# Patient Record
Sex: FEMALE
[De-identification: names, ages, dates, MRNs, and addresses within clinical notes are randomized per-mention and may not be internally consistent; named-entity substitution may affect disease eponyms.]

---

## 2024-05-14 ENCOUNTER — RX ONLY (RX ONLY)
Age: 59
End: 2024-05-14

## 2024-05-14 RX ORDER — LORAZEPAM 1 MG/1
TABLET ORAL
Qty: 2 | Refills: 0 | Status: ERX | COMMUNITY
Start: 2024-05-14

## 2024-06-13 ENCOUNTER — APPOINTMENT (OUTPATIENT)
Dept: URBAN - METROPOLITAN AREA CLINIC 250 | Age: 59
Setting detail: DERMATOLOGY
End: 2024-06-13

## 2024-06-13 PROBLEM — C44.319 BASAL CELL CARCINOMA OF SKIN OF OTHER PARTS OF FACE: Status: ACTIVE | Noted: 2024-06-13

## 2024-06-13 PROCEDURE — 77280 THER RAD SIMULAJ FIELD SMPL: CPT

## 2024-06-13 PROCEDURE — 99213 OFFICE O/P EST LOW 20 MIN: CPT | Mod: 25

## 2024-06-13 PROCEDURE — OTHER IMAGE GUIDED - SUPERFICIAL RADIOTHERAPY: SIMULATION VISIT: OTHER

## 2024-06-13 PROCEDURE — 77300 RADIATION THERAPY DOSE PLAN: CPT

## 2024-06-13 PROCEDURE — G6001 ECHO GUIDANCE RADIOTHERAPY: HCPCS | Mod: GA

## 2024-06-13 PROCEDURE — OTHER IMAGE GUIDED - SUPERFICIAL RADIOTHERAPY: OTHER

## 2024-06-13 PROCEDURE — 77332 RADIATION TREATMENT AID(S): CPT

## 2024-06-13 PROCEDURE — 77261 THER RADIOLOGY TX PLNG SMPL: CPT

## 2024-06-13 NOTE — HPI: SKIN LESION (BASAL CELL CARCINOMA)
Is This A New Presentation, Or A Follow-Up?: Skin Lesion
Additional History: Patient was referred from Dr. Escalante's office for BCC on the Right Cheek.
Location From Outside Provider (Will Override Previously Chosen Location): Right Cheek

## 2024-06-13 NOTE — PROCEDURE: IMAGE GUIDED - SUPERFICIAL RADIOTHERAPY: SIMULATION VISIT
Bill For Dosimetry/Prescription Creation (58374): Yes
Additional Comments (Add Customization Of Note Here): Treatment area is showing a well healed biopsy site.
Ultrasound Not Used Text: Ultrasound was not performed today due to
Simulation Documentation: Per the request of Dr. Huynh the patient was seen today for Superficial Radiation Therapy planning requiring initial simulation in preparation for treatment of specific diseased sites. Simulation is necessary to determine the correct patient and treatment portal positioning, to deliver safe and effective Radiotherapy. A high-frequency ultrasound image was acquired prior to treatment today for two- dimensional evaluation of tumor volume and will be repeated per fraction for response to treatment, in addition, to geometric accuracy of field placement. Physician evaluation of the ultrasound tumor depth, width, and breadth will be ongoing through the course of treatment and is deemed medically necessary ensuring efficacy of treatment and determine whether to proceed with delivery of therapeutic. Today’s image and setup were evaluated to determine the initiation of treatment. All appropriate blocking and treatment parameters were verified by the radiation therapist and provider.\\n\\nPer Dr. Huynh, continued per fraction ultrasound guidance and simulation are required for field placement, measurement of tumor depth, width and breadth, progress, and edema monitoring.\\n\\nPer the request of Dr. Huynh , continuing medical physics review as per radiotherapy standard of care post every 5th fraction for the patient, including assessment of treatment parameters,  of dose delivery, and review of patient treatment documentation in support of the provider, is ordered, ensuring efficacy and continued safe delivery of radiotherapy. Included in the physics check is a review of patient setup information, all pertinent simulation and treatment photograph(s) checks, prescription, dose calculation verification, per fraction dose charted correctly, elapsed days and treatment days correctly charted, cumulative dose correct, and review of any prescription changes. Continued medical physics review post every 5th fraction of radiotherapy is requested by the provider for appropriate radiotherapy management and is deemed medically necessary and standard of care.
Bill For Treatment Planning: Yes- (Simple Planning- 1 Site: 25317)
Patient Positioning: Supine
Bill For Simulation: Yes- (Simple- 1 Site: 15298)
Ultrasound Used Text: Ultrasound depth is 1.16 mm.
Limb Positioning Or Elevation: elevated

## 2024-06-13 NOTE — PROCEDURE: IMAGE GUIDED - SUPERFICIAL RADIOTHERAPY
Body Location Override (Optional): Right Cheek
Detail Level: Detailed
Field Number: 1
Lesion Dimensions-X Axis In Cm: 0.6
Lesion Dimensions-Y Axis In Cm: 0.7
Lesion Margin Size In Cm: 0.5
Shield Size In Cm: 1.7 x 1.7
Applicator Size In Cm: 2.0 cm
Energy (Kv): 70
Treatment Time (Min): 0.45
Time, Dose, Fractionation Factor (Tdf) For Prescription 1: 97
Daily Dose (Cgy): 274.05
Number Of Fractions For Prescription 1: 20
Treatments Per Week: 3
Total Dose For Prescription 1 (Cgy): 5433
Add A Second Prescription?: No
Additional Fraction(S) Needed:: 0
Bill For Physics Consultation: No - Render Text Only
Physics Documentation: (Physics Check Verbiage)

## 2024-07-09 ENCOUNTER — APPOINTMENT (OUTPATIENT)
Dept: URBAN - METROPOLITAN AREA CLINIC 250 | Age: 59
Setting detail: DERMATOLOGY
End: 2024-07-10

## 2024-07-09 PROBLEM — C44.319 BASAL CELL CARCINOMA OF SKIN OF OTHER PARTS OF FACE: Status: ACTIVE | Noted: 2024-07-09

## 2024-07-09 PROCEDURE — 77401 RADIATION TX DELIVERY SUPFC: CPT

## 2024-07-09 PROCEDURE — OTHER IMAGE GUIDED - SUPERFICIAL RADIOTHERAPY: TREATMENT VISIT: OTHER

## 2024-07-09 PROCEDURE — OTHER IMAGE GUIDED - SUPERFICIAL RADIOTHERAPY: OTHER

## 2024-07-09 PROCEDURE — G6001 ECHO GUIDANCE RADIOTHERAPY: HCPCS | Mod: XU

## 2024-07-09 PROCEDURE — 77280 THER RAD SIMULAJ FIELD SMPL: CPT

## 2024-07-09 NOTE — PROCEDURE: IMAGE GUIDED - SUPERFICIAL RADIOTHERAPY
Body Location Override (Optional): Right Cheek
Detail Level: Detailed
Field Number: 1
Lesion Dimensions-X Axis In Cm: 0.6
Lesion Dimensions-Y Axis In Cm: 0.7
Lesion Margin Size In Cm: 0.5
Shield Size In Cm: 1.7 x 1.7
Applicator Size In Cm: 2.0 cm
Energy (Kv): 70
Treatment Time (Min): 0.45
Time, Dose, Fractionation Factor (Tdf) For Prescription 1: 97
Daily Dose (Cgy): 274.05
Number Of Fractions For Prescription 1: 20
Treatments Per Week: 3
Total Dose For Prescription 1 (Cgy): 5402
Add A Second Prescription?: No
Additional Fraction(S) Needed:: 0
Bill For Physics Consultation: No - Render Text Only
Physics Documentation: (Physics Check Verbiage)

## 2024-07-09 NOTE — PROCEDURE: IMAGE GUIDED - SUPERFICIAL RADIOTHERAPY: TREATMENT VISIT
Ultrasound Used Text: High frequency ultrasound depth is 1.08 mm, which is 0.08 mm in difference from previous imaging.
Fraction Number: 1
Energy (Kv): 70
Bill For Simulation (Per Medicare, Typical Course Of Radiation Therapy Will Require Between One To Three Simulations): Yes- (Simple- 1 Site: 61814)
Ultrasound Not Used Text: Ultrasound was not performed today due to
Additional Change To Daily Dosage Administered Mid Treatment?: No
Daily Dosage (Cgy): 274.05
Bill For Treatment (51854)?: Yes
Calculate Total Cumulative Dose Automatically Or Manually: Manually
Treatment Documentation: This patient has been treated today with image-guided superficial radiation therapy for non-melanoma skin cancer. Written informed consent has been previously obtained from this patient for this treatment. This consent is documented in the patient's chart. The patient gave verbal consent to continue treatment today. The patient was treated with a specific radiation dose and setup as prescribed by the provider listed on this visit note. A Radiation Therapist performed administration of radiation under the supervision of a provider. The treatment parameters and cumulative dose are indicated above. Prior to administering the radiation, the patient underwent a verification therapeutic radiology simulation-aided field setting defining relevant normal and abnormal target anatomy and acquiring images with separate and distinct diagnostic high-frequency ultrasound to delineate tissues and determine whether to proceed with delivery of therapeutic, in addition to retrieve data necessary to develop an optimal radiation treatment process for the patient. The field placement simulation documents any change seen in overall tumor volume documented in the patient’s record, allows the clinician to indicate any needed changes in the treatment plan and/or prescription, provides diagnostic evaluation as the basis for performing the therapeutic procedure, and clearly identifies the information needed to decide to proceed with the therapeutic procedure. This process includes verification of the treatment port(s) and proper treatment positioning. All treatment ports were photographed within electronic medical records. The patient's lead blocking along with gross tumor volume and margin was confirmed. Considering superficial radiotherapy is clinical in setup, this requires the physician and radiation therapist to clarify the location interest being treated against initial images, ultrasound, pathology, and patient anatomy. Care was taken to ensure de la cruz treated were geometrically accurate and properly positioned using therapeutic radiology simulation-aided field setting verification per fraction. This process is also utilized to determine if any prescription or setup changes are necessary. These steps are therefore medically necessary to ensure safe and effective administration of radiation. Ongoing therapeutic radiology simulation-aided field setting verification is ordered throughout the course of therapy.\\n\\nA high-frequency ultrasound image was acquired today for a two-dimensional evaluation of the tumor volume, depth, width, breadth, review of prior response to treatment, provide geometric accuracy of field placement, and determine whether to proceed with therapeutic delivery. \\n\\nThe field placement and ultrasound imaging, per fraction, is separate and distinct from the initial simulation and is an important task in providing safe administration of superficial radiation therapy. Physician evaluation of the ultrasound information will be ongoing throughout the course of treatment and is deemed medically necessary to ensure the efficacy of treatment, whether to proceed with therapeutic delivery, and determine any necessary changes. Today's images were evaluated for determination of continuation of treatment with the current plan or with necessary changes as appropriate. Additionally, the use of ultrasound visualization and targeted assessment allows the patient to be able to see their cancer(s) progress, encouraging the patient to complete and maintain compliance through the full course of prescribed radiotherapy. Per Dr. Huynh, continued ultrasound guidance and therapeutic radiology simulation-aided field setting verification per fraction is required for field placement, measurement of tumor depth, tissue evaluation, progress, acute effect monitoring, and determination for therapeutic treatment delivery is appropriate.
Add X Modifier?: CHEN - Unusual Non-Overlapping Service
Additional Comments (Add Customization Of Note Here): Patient did well today for her first treatment visit.

## 2024-07-10 ENCOUNTER — APPOINTMENT (OUTPATIENT)
Dept: URBAN - METROPOLITAN AREA CLINIC 250 | Age: 59
Setting detail: DERMATOLOGY
End: 2024-07-14

## 2024-07-10 PROBLEM — C44.319 BASAL CELL CARCINOMA OF SKIN OF OTHER PARTS OF FACE: Status: ACTIVE | Noted: 2024-07-10

## 2024-07-10 PROCEDURE — G6001 ECHO GUIDANCE RADIOTHERAPY: HCPCS | Mod: XU

## 2024-07-10 PROCEDURE — OTHER IMAGE GUIDED - SUPERFICIAL RADIOTHERAPY: OTHER

## 2024-07-10 PROCEDURE — 77280 THER RAD SIMULAJ FIELD SMPL: CPT

## 2024-07-10 PROCEDURE — OTHER IMAGE GUIDED - SUPERFICIAL RADIOTHERAPY: TREATMENT VISIT: OTHER

## 2024-07-10 PROCEDURE — 77401 RADIATION TX DELIVERY SUPFC: CPT

## 2024-07-10 NOTE — PROCEDURE: IMAGE GUIDED - SUPERFICIAL RADIOTHERAPY
Body Location Override (Optional): Right Cheek
Detail Level: Detailed
Field Number: 1
Lesion Dimensions-X Axis In Cm: 0.6
Lesion Dimensions-Y Axis In Cm: 0.7
Lesion Margin Size In Cm: 0.5
Shield Size In Cm: 1.7 x 1.7
Applicator Size In Cm: 2.0 cm
Energy (Kv): 70
Treatment Time (Min): 0.45
Time, Dose, Fractionation Factor (Tdf) For Prescription 1: 97
Daily Dose (Cgy): 274.05
Number Of Fractions For Prescription 1: 20
Treatments Per Week: 3
Total Dose For Prescription 1 (Cgy): 5404
Add A Second Prescription?: No
Additional Fraction(S) Needed:: 0
Bill For Physics Consultation: No - Render Text Only
Physics Documentation: (Physics Check Verbiage)

## 2024-07-16 ENCOUNTER — APPOINTMENT (OUTPATIENT)
Dept: URBAN - METROPOLITAN AREA CLINIC 250 | Age: 59
Setting detail: DERMATOLOGY
End: 2024-07-17

## 2024-07-16 PROBLEM — C44.319 BASAL CELL CARCINOMA OF SKIN OF OTHER PARTS OF FACE: Status: ACTIVE | Noted: 2024-07-16

## 2024-07-16 PROCEDURE — OTHER IMAGE GUIDED - SUPERFICIAL RADIOTHERAPY: OTHER

## 2024-07-16 PROCEDURE — 77280 THER RAD SIMULAJ FIELD SMPL: CPT

## 2024-07-16 PROCEDURE — G6001 ECHO GUIDANCE RADIOTHERAPY: HCPCS | Mod: XU

## 2024-07-16 PROCEDURE — 77401 RADIATION TX DELIVERY SUPFC: CPT

## 2024-07-16 PROCEDURE — OTHER IMAGE GUIDED - SUPERFICIAL RADIOTHERAPY: TREATMENT VISIT: OTHER

## 2024-07-16 NOTE — PROCEDURE: IMAGE GUIDED - SUPERFICIAL RADIOTHERAPY: TREATMENT VISIT
Ultrasound Used Text: High frequency ultrasound depth is 1.10 mm, which is 0.12 mm in difference from previous imaging.
Fraction Number: 3
Energy (Kv): 70
Bill For Simulation (Per Medicare, Typical Course Of Radiation Therapy Will Require Between One To Three Simulations): Yes- (Simple- 1 Site: 52977)
Ultrasound Not Used Text: Ultrasound was not performed today due to
Additional Change To Daily Dosage Administered Mid Treatment?: No
Daily Dosage (Cgy): 274.05
Bill For Treatment (83040)?: Yes
Calculate Total Cumulative Dose Automatically Or Manually: Manually
Total Cumulative Dose (Cgy): 822.15
Treatment Documentation: This patient has been treated today with image-guided superficial radiation therapy for non-melanoma skin cancer. Written informed consent has been previously obtained from this patient for this treatment. This consent is documented in the patient's chart. The patient gave verbal consent to continue treatment today. The patient was treated with a specific radiation dose and setup as prescribed by the provider listed on this visit note. A Radiation Therapist performed administration of radiation under the supervision of a provider. The treatment parameters and cumulative dose are indicated above. Prior to administering the radiation, the patient underwent a verification therapeutic radiology simulation-aided field setting defining relevant normal and abnormal target anatomy and acquiring images with separate and distinct diagnostic high-frequency ultrasound to delineate tissues and determine whether to proceed with delivery of therapeutic, in addition to retrieve data necessary to develop an optimal radiation treatment process for the patient. The field placement simulation documents any change seen in overall tumor volume documented in the patient’s record, allows the clinician to indicate any needed changes in the treatment plan and/or prescription, provides diagnostic evaluation as the basis for performing the therapeutic procedure, and clearly identifies the information needed to decide to proceed with the therapeutic procedure. This process includes verification of the treatment port(s) and proper treatment positioning. All treatment ports were photographed within electronic medical records. The patient's lead blocking along with gross tumor volume and margin was confirmed. Considering superficial radiotherapy is clinical in setup, this requires the physician and radiation therapist to clarify the location interest being treated against initial images, ultrasound, pathology, and patient anatomy. Care was taken to ensure de la cruz treated were geometrically accurate and properly positioned using therapeutic radiology simulation-aided field setting verification per fraction. This process is also utilized to determine if any prescription or setup changes are necessary. These steps are therefore medically necessary to ensure safe and effective administration of radiation. Ongoing therapeutic radiology simulation-aided field setting verification is ordered throughout the course of therapy.\\n\\nA high-frequency ultrasound image was acquired today for a two-dimensional evaluation of the tumor volume, depth, width, breadth, review of prior response to treatment, provide geometric accuracy of field placement, and determine whether to proceed with therapeutic delivery. \\n\\nThe field placement and ultrasound imaging, per fraction, is separate and distinct from the initial simulation and is an important task in providing safe administration of superficial radiation therapy. Physician evaluation of the ultrasound information will be ongoing throughout the course of treatment and is deemed medically necessary to ensure the efficacy of treatment, whether to proceed with therapeutic delivery, and determine any necessary changes. Today's images were evaluated for determination of continuation of treatment with the current plan or with necessary changes as appropriate. Additionally, the use of ultrasound visualization and targeted assessment allows the patient to be able to see their cancer(s) progress, encouraging the patient to complete and maintain compliance through the full course of prescribed radiotherapy. Per Dr. Huynh, continued ultrasound guidance and therapeutic radiology simulation-aided field setting verification per fraction is required for field placement, measurement of tumor depth, tissue evaluation, progress, acute effect monitoring, and determination for therapeutic treatment delivery is appropriate.
Add X Modifier?: CHEN - Unusual Non-Overlapping Service
Additional Comments (Add Customization Of Note Here): Treatment area is showing a well healed biopsy site. She had no issues to report.
Prescription Used: 1

## 2024-07-16 NOTE — PROCEDURE: IMAGE GUIDED - SUPERFICIAL RADIOTHERAPY
Body Location Override (Optional): Right Cheek
Detail Level: Detailed
Field Number: 1
Lesion Dimensions-X Axis In Cm: 0.6
Lesion Dimensions-Y Axis In Cm: 0.7
Lesion Margin Size In Cm: 0.5
Shield Size In Cm: 1.7 x 1.7
Applicator Size In Cm: 2.0 cm
Energy (Kv): 70
Treatment Time (Min): 0.45
Time, Dose, Fractionation Factor (Tdf) For Prescription 1: 97
Daily Dose (Cgy): 274.05
Number Of Fractions For Prescription 1: 20
Treatments Per Week: 3
Total Dose For Prescription 1 (Cgy): 5443
Add A Second Prescription?: No
Additional Fraction(S) Needed:: 0
Bill For Physics Consultation: No - Render Text Only
Physics Documentation: (Physics Check Verbiage)

## 2024-07-17 ENCOUNTER — APPOINTMENT (OUTPATIENT)
Dept: URBAN - METROPOLITAN AREA CLINIC 250 | Age: 59
Setting detail: DERMATOLOGY
End: 2024-07-20

## 2024-07-17 PROBLEM — C44.319 BASAL CELL CARCINOMA OF SKIN OF OTHER PARTS OF FACE: Status: ACTIVE | Noted: 2024-07-17

## 2024-07-17 PROCEDURE — OTHER IMAGE GUIDED - SUPERFICIAL RADIOTHERAPY: OTHER

## 2024-07-17 PROCEDURE — OTHER IMAGE GUIDED - SUPERFICIAL RADIOTHERAPY: TREATMENT VISIT: OTHER

## 2024-07-17 PROCEDURE — 77280 THER RAD SIMULAJ FIELD SMPL: CPT

## 2024-07-17 PROCEDURE — 77401 RADIATION TX DELIVERY SUPFC: CPT

## 2024-07-17 PROCEDURE — G6001 ECHO GUIDANCE RADIOTHERAPY: HCPCS | Mod: XU

## 2024-07-17 NOTE — PROCEDURE: IMAGE GUIDED - SUPERFICIAL RADIOTHERAPY: TREATMENT VISIT
Ultrasound Used Text: High frequency ultrasound depth is 1.02 mm, which is 0.08 mm in difference from previous imaging.
Fraction Number: 4
Energy (Kv): 70
Bill For Simulation (Per Medicare, Typical Course Of Radiation Therapy Will Require Between One To Three Simulations): Yes- (Simple- 1 Site: 33545)
Ultrasound Not Used Text: Ultrasound was not performed today due to
Additional Change To Daily Dosage Administered Mid Treatment?: No
Daily Dosage (Cgy): 274.05
Bill For Treatment (93057)?: Yes
Calculate Total Cumulative Dose Automatically Or Manually: Manually
Total Cumulative Dose (Cgy): 1096.2
Treatment Documentation: This patient has been treated today with image-guided superficial radiation therapy for non-melanoma skin cancer. Written informed consent has been previously obtained from this patient for this treatment. This consent is documented in the patient's chart. The patient gave verbal consent to continue treatment today. The patient was treated with a specific radiation dose and setup as prescribed by the provider listed on this visit note. A Radiation Therapist performed administration of radiation under the supervision of a provider. The treatment parameters and cumulative dose are indicated above. Prior to administering the radiation, the patient underwent a verification therapeutic radiology simulation-aided field setting defining relevant normal and abnormal target anatomy and acquiring images with separate and distinct diagnostic high-frequency ultrasound to delineate tissues and determine whether to proceed with delivery of therapeutic, in addition to retrieve data necessary to develop an optimal radiation treatment process for the patient. The field placement simulation documents any change seen in overall tumor volume documented in the patient’s record, allows the clinician to indicate any needed changes in the treatment plan and/or prescription, provides diagnostic evaluation as the basis for performing the therapeutic procedure, and clearly identifies the information needed to decide to proceed with the therapeutic procedure. This process includes verification of the treatment port(s) and proper treatment positioning. All treatment ports were photographed within electronic medical records. The patient's lead blocking along with gross tumor volume and margin was confirmed. Considering superficial radiotherapy is clinical in setup, this requires the physician and radiation therapist to clarify the location interest being treated against initial images, ultrasound, pathology, and patient anatomy. Care was taken to ensure de la cruz treated were geometrically accurate and properly positioned using therapeutic radiology simulation-aided field setting verification per fraction. This process is also utilized to determine if any prescription or setup changes are necessary. These steps are therefore medically necessary to ensure safe and effective administration of radiation. Ongoing therapeutic radiology simulation-aided field setting verification is ordered throughout the course of therapy.\\n\\nA high-frequency ultrasound image was acquired today for a two-dimensional evaluation of the tumor volume, depth, width, breadth, review of prior response to treatment, provide geometric accuracy of field placement, and determine whether to proceed with therapeutic delivery. \\n\\nThe field placement and ultrasound imaging, per fraction, is separate and distinct from the initial simulation and is an important task in providing safe administration of superficial radiation therapy. Physician evaluation of the ultrasound information will be ongoing throughout the course of treatment and is deemed medically necessary to ensure the efficacy of treatment, whether to proceed with therapeutic delivery, and determine any necessary changes. Today's images were evaluated for determination of continuation of treatment with the current plan or with necessary changes as appropriate. Additionally, the use of ultrasound visualization and targeted assessment allows the patient to be able to see their cancer(s) progress, encouraging the patient to complete and maintain compliance through the full course of prescribed radiotherapy. Per Dr. Huynh, continued ultrasound guidance and therapeutic radiology simulation-aided field setting verification per fraction is required for field placement, measurement of tumor depth, tissue evaluation, progress, acute effect monitoring, and determination for therapeutic treatment delivery is appropriate.
Add X Modifier?: CHEN - Unusual Non-Overlapping Service
Additional Comments (Add Customization Of Note Here): Treatment area is showing a well healed biopsy site. She had no issues to report and she is using cream daily.
Prescription Used: 1

## 2024-07-17 NOTE — PROCEDURE: IMAGE GUIDED - SUPERFICIAL RADIOTHERAPY
Body Location Override (Optional): Right Cheek
Detail Level: Detailed
Field Number: 1
Lesion Dimensions-X Axis In Cm: 0.6
Lesion Dimensions-Y Axis In Cm: 0.7
Lesion Margin Size In Cm: 0.5
Shield Size In Cm: 1.7 x 1.7
Applicator Size In Cm: 2.0 cm
Energy (Kv): 70
Treatment Time (Min): 0.45
Time, Dose, Fractionation Factor (Tdf) For Prescription 1: 97
Daily Dose (Cgy): 274.05
Number Of Fractions For Prescription 1: 20
Treatments Per Week: 3
Total Dose For Prescription 1 (Cgy): 5484
Add A Second Prescription?: No
Additional Fraction(S) Needed:: 0
Bill For Physics Consultation: No - Render Text Only
Physics Documentation: (Physics Check Verbiage)

## 2024-07-18 ENCOUNTER — APPOINTMENT (OUTPATIENT)
Dept: URBAN - METROPOLITAN AREA CLINIC 250 | Age: 59
Setting detail: DERMATOLOGY
End: 2024-07-19

## 2024-07-18 PROBLEM — C44.319 BASAL CELL CARCINOMA OF SKIN OF OTHER PARTS OF FACE: Status: ACTIVE | Noted: 2024-07-18

## 2024-07-18 PROCEDURE — 77401 RADIATION TX DELIVERY SUPFC: CPT

## 2024-07-18 PROCEDURE — OTHER IMAGE GUIDED - SUPERFICIAL RADIOTHERAPY: OTHER

## 2024-07-18 PROCEDURE — 99213 OFFICE O/P EST LOW 20 MIN: CPT | Mod: 25

## 2024-07-18 PROCEDURE — G6001 ECHO GUIDANCE RADIOTHERAPY: HCPCS | Mod: XU

## 2024-07-18 PROCEDURE — 77280 THER RAD SIMULAJ FIELD SMPL: CPT

## 2024-07-18 PROCEDURE — OTHER IMAGE GUIDED - SUPERFICIAL RADIOTHERAPY: TREATMENT VISIT: OTHER

## 2024-07-18 PROCEDURE — OTHER IMAGE GUIDED - SUPERFICIAL RADIOTHERAPY: EVALUATION VISIT: OTHER

## 2024-07-18 NOTE — PROCEDURE: IMAGE GUIDED - SUPERFICIAL RADIOTHERAPY
Body Location Override (Optional): Right Cheek
Detail Level: Detailed
Field Number: 1
Lesion Dimensions-X Axis In Cm: 0.6
Lesion Dimensions-Y Axis In Cm: 0.7
Lesion Margin Size In Cm: 0.5
Shield Size In Cm: 1.7 x 1.7
Applicator Size In Cm: 2.0 cm
Energy (Kv): 70
Treatment Time (Min): 0.45
Time, Dose, Fractionation Factor (Tdf) For Prescription 1: 97
Daily Dose (Cgy): 274.05
Number Of Fractions For Prescription 1: 20
Treatments Per Week: 3
Total Dose For Prescription 1 (Cgy): 5486
Add A Second Prescription?: No
Additional Fraction(S) Needed:: 0
Bill For Physics Consultation: No - Render Text Only
Physics Documentation: (Physics Check Verbiage)

## 2024-07-18 NOTE — PROCEDURE: IMAGE GUIDED - SUPERFICIAL RADIOTHERAPY: EVALUATION VISIT
Is This Visit For Evaluation During Treatment Or Follow Up Post Treatment?: evaluation
Show Ultrasound In Note?: No
Evaluation Plan: The patient is undergoing superficial radiation therapy for skin cancer and presents for weekly evaluation and management. Per protocol and as documented on the flow sheet, the patient was questioned as to subjective redness, pruritus, pain, drainage, fatigue, or any other symptoms. Objectively, the radiation area was evaluated with regards to erythema, atrophy, scale, crusting, erosion, ulceration, edema, purpura, tenderness, warmth, drainage, and any other findings. The plan was extensively reviewed including dose and dosing schedule. The simulation and clinical setup were also reviewed as were external and any internal shields and based on this review the appropriateness and sufficiency of treatment was determined.\\n\\nA high-frequency ultrasound image was acquired today for a two-dimensional evaluation of the tumor volume, depth, width, breadth, review of prior response to treatment, provide geometric accuracy of field placement, and determine whether to proceed with therapeutic delivery.\\n\\nPer Dr. Huynh, continued ultrasound guidance and therapeutic radiology simulation-aided field setting verification per fraction is required for field placement, measurement of tumor depth, tissues evaluation, progress, acute effect monitoring, and determination for therapeutic treatment delivery is appropriate.
Assessment: Appropriate reaction
Radiation Therapy Oncology [facility] (Rtog) Score: 1
Ultrasound Used Text: Today's ultrasound showed no evidence of disease.
Ultrasound Not Used Text: Ultrasound was not performed today due to
Follow Up Plan: Per the request of Dr. Huynh, patient was seen today for a 12 week follow up for Superficial Radiation Therapy. Physician evaluation of the ultrasound tumor depth, width, and breadth was ongoing through course of treatment and is deemed medically necessary ensuring efficacy of treatment. All appropriate blocking and treatment parameters verified by radiation therapist according to initial simulation. A high frequency ultrasound image was acquired today for two-dimensional evaluation of treatment volume and response to treatment, in addition, geometric accuracy of field placement. Today’s image was evaluated, lesion not detected on US.\\n\\nObjectively, the treated radiation area was evaluated with regards to erythema, atrophy, scale, crusting, erosion, ulceration, edema, purpura, tenderness, warmth, drainage, and any other finding. Patient to follow up for routine visits.
Additional Comments (Add Customization Of Note Here): Patient was doing well today.

## 2024-07-18 NOTE — PROCEDURE: IMAGE GUIDED - SUPERFICIAL RADIOTHERAPY: TREATMENT VISIT
Ultrasound Used Text: High frequency ultrasound depth is 0.96 mm, which is 0.06 mm in difference from previous imaging.
Fraction Number: 5
Energy (Kv): 70
Bill For Simulation (Per Medicare, Typical Course Of Radiation Therapy Will Require Between One To Three Simulations): Yes- (Simple- 1 Site: 73782)
Ultrasound Not Used Text: Ultrasound was not performed today due to
Additional Change To Daily Dosage Administered Mid Treatment?: No
Daily Dosage (Cgy): 274.05
Bill For Treatment (52950)?: Yes
Calculate Total Cumulative Dose Automatically Or Manually: Manually
Total Cumulative Dose (Cgy): 1370.25
Treatment Documentation: This patient has been treated today with image-guided superficial radiation therapy for non-melanoma skin cancer. Written informed consent has been previously obtained from this patient for this treatment. This consent is documented in the patient's chart. The patient gave verbal consent to continue treatment today. The patient was treated with a specific radiation dose and setup as prescribed by the provider listed on this visit note. A Radiation Therapist performed administration of radiation under the supervision of a provider. The treatment parameters and cumulative dose are indicated above. Prior to administering the radiation, the patient underwent a verification therapeutic radiology simulation-aided field setting defining relevant normal and abnormal target anatomy and acquiring images with separate and distinct diagnostic high-frequency ultrasound to delineate tissues and determine whether to proceed with delivery of therapeutic, in addition to retrieve data necessary to develop an optimal radiation treatment process for the patient. The field placement simulation documents any change seen in overall tumor volume documented in the patient’s record, allows the clinician to indicate any needed changes in the treatment plan and/or prescription, provides diagnostic evaluation as the basis for performing the therapeutic procedure, and clearly identifies the information needed to decide to proceed with the therapeutic procedure. This process includes verification of the treatment port(s) and proper treatment positioning. All treatment ports were photographed within electronic medical records. The patient's lead blocking along with gross tumor volume and margin was confirmed. Considering superficial radiotherapy is clinical in setup, this requires the physician and radiation therapist to clarify the location interest being treated against initial images, ultrasound, pathology, and patient anatomy. Care was taken to ensure de la cruz treated were geometrically accurate and properly positioned using therapeutic radiology simulation-aided field setting verification per fraction. This process is also utilized to determine if any prescription or setup changes are necessary. These steps are therefore medically necessary to ensure safe and effective administration of radiation. Ongoing therapeutic radiology simulation-aided field setting verification is ordered throughout the course of therapy.\\n\\nA high-frequency ultrasound image was acquired today for a two-dimensional evaluation of the tumor volume, depth, width, breadth, review of prior response to treatment, provide geometric accuracy of field placement, and determine whether to proceed with therapeutic delivery. \\n\\nThe field placement and ultrasound imaging, per fraction, is separate and distinct from the initial simulation and is an important task in providing safe administration of superficial radiation therapy. Physician evaluation of the ultrasound information will be ongoing throughout the course of treatment and is deemed medically necessary to ensure the efficacy of treatment, whether to proceed with therapeutic delivery, and determine any necessary changes. Today's images were evaluated for determination of continuation of treatment with the current plan or with necessary changes as appropriate. Additionally, the use of ultrasound visualization and targeted assessment allows the patient to be able to see their cancer(s) progress, encouraging the patient to complete and maintain compliance through the full course of prescribed radiotherapy. Per Dr. Huynh, continued ultrasound guidance and therapeutic radiology simulation-aided field setting verification per fraction is required for field placement, measurement of tumor depth, tissue evaluation, progress, acute effect monitoring, and determination for therapeutic treatment delivery is appropriate.
Add X Modifier?: CHEN - Unusual Non-Overlapping Service
Additional Comments (Add Customization Of Note Here): Treatment area is showing a well healed biopsy site. 
Prescription Used: 1

## 2024-07-20 ENCOUNTER — APPOINTMENT (OUTPATIENT)
Dept: URBAN - METROPOLITAN AREA CLINIC 250 | Age: 59
Setting detail: DERMATOLOGY
End: 2024-10-15

## 2024-07-20 PROBLEM — C44.319 BASAL CELL CARCINOMA OF SKIN OF OTHER PARTS OF FACE: Status: ACTIVE | Noted: 2024-07-20

## 2024-07-20 PROCEDURE — OTHER IMAGE GUIDED - SUPERFICIAL RADIOTHERAPY: OTHER

## 2024-07-20 PROCEDURE — 77336 RADIATION PHYSICS CONSULT: CPT

## 2024-07-20 NOTE — PROCEDURE: IMAGE GUIDED - SUPERFICIAL RADIOTHERAPY
Body Location Override (Optional): Right Cheek
Detail Level: Detailed
Field Number: 1
Lesion Dimensions-X Axis In Cm: 0.6
Lesion Dimensions-Y Axis In Cm: 0.7
Lesion Margin Size In Cm: 0.5
Shield Size In Cm: 1.7 x 1.7
Applicator Size In Cm: 2.0 cm
Energy (Kv): 70
Treatment Time (Min): 0.45
Time, Dose, Fractionation Factor (Tdf) For Prescription 1: 97
Daily Dose (Cgy): 274.05
Number Of Fractions For Prescription 1: 20
Treatments Per Week: 3
Total Dose For Prescription 1 (Cgy): 5483
Add A Second Prescription?: No
Add Decay And Dose Adjustment Calculation?: Yes
Treatment Date (Start): 07/09/2024
Treatment Date (Stop): 08/16/2024
Treatment Date (Resume): 08/27/2024
Tdf Of Fractions Delivered: 69.5
Decay Factor: 0.98
Adjusted Tdf: 68.11
Remaining Tdf: 30
New Total Tdf (Remaining Tdf + Adjusted Tdf): 98.11
Additional Fraction(S) Needed:: 0
Dose Decay Verbiage For Visit Day Calculation: Per the request of Dr. Huynh dose decay calculation for this patient was performed due to break in treatment. Dose decay calculation is clinically necessary to determine appropriate changes to prescription, if necessary, for efficacy of treatment.\\n\\nPer Dr. Huynh, no prescription change was made today.
Physics Consultation Performed For Fractions:: 1-5
Physics Documentation: Per the request of Dr. Huynh, continuing medical physics review as per radiotherapy standard of care post every 5th fraction for patient, including assessment of treatment parameters,  of dose delivery, and review of patient treatment documentation in support of the provider, to ensure efficacy and continued safe delivery of radiotherapy. Included in physics check is review of patient setup information, all pertinent simulation and treatment photographs checks, prescription, dose calculation verification, per fraction dose charted correctly, elapsed days and treatment days correctly charted, cumulative dose correct, and review of any prescription changes. Patient was not present, nor was it necessary for the patient to be present for weekly physics review and no other superficial radiotherapy services were rendered on this day. Continued medical physics review post every 5th fraction of therapy is requested by provider for appropriate radiotherapy management and is deemed medically necessary and standard of care.

## 2024-07-22 ENCOUNTER — APPOINTMENT (OUTPATIENT)
Dept: URBAN - METROPOLITAN AREA CLINIC 250 | Age: 59
Setting detail: DERMATOLOGY
End: 2024-07-23

## 2024-07-22 PROBLEM — C44.319 BASAL CELL CARCINOMA OF SKIN OF OTHER PARTS OF FACE: Status: ACTIVE | Noted: 2024-07-22

## 2024-07-22 PROCEDURE — 77280 THER RAD SIMULAJ FIELD SMPL: CPT

## 2024-07-22 PROCEDURE — OTHER IMAGE GUIDED - SUPERFICIAL RADIOTHERAPY: OTHER

## 2024-07-22 PROCEDURE — G6001 ECHO GUIDANCE RADIOTHERAPY: HCPCS | Mod: XU

## 2024-07-22 PROCEDURE — OTHER IMAGE GUIDED - SUPERFICIAL RADIOTHERAPY: TREATMENT VISIT: OTHER

## 2024-07-22 PROCEDURE — 77401 RADIATION TX DELIVERY SUPFC: CPT

## 2024-07-22 NOTE — PROCEDURE: IMAGE GUIDED - SUPERFICIAL RADIOTHERAPY: TREATMENT VISIT
Ultrasound Used Text: High frequency ultrasound depth is 0.92 mm, which is 0.04 mm in difference from previous imaging.
Fraction Number: 6
Energy (Kv): 70
Bill For Simulation (Per Medicare, Typical Course Of Radiation Therapy Will Require Between One To Three Simulations): Yes- (Simple- 1 Site: 85621)
Ultrasound Not Used Text: Ultrasound was not performed today due to
Additional Change To Daily Dosage Administered Mid Treatment?: No
Daily Dosage (Cgy): 274.05
Bill For Treatment (83110)?: Yes
Calculate Total Cumulative Dose Automatically Or Manually: Manually
Total Cumulative Dose (Cgy): 1644.3
Treatment Documentation: This patient has been treated today with image-guided superficial radiation therapy for non-melanoma skin cancer. Written informed consent has been previously obtained from this patient for this treatment. This consent is documented in the patient's chart. The patient gave verbal consent to continue treatment today. The patient was treated with a specific radiation dose and setup as prescribed by the provider listed on this visit note. A Radiation Therapist performed administration of radiation under the supervision of a provider. The treatment parameters and cumulative dose are indicated above. Prior to administering the radiation, the patient underwent a verification therapeutic radiology simulation-aided field setting defining relevant normal and abnormal target anatomy and acquiring images with separate and distinct diagnostic high-frequency ultrasound to delineate tissues and determine whether to proceed with delivery of therapeutic, in addition to retrieve data necessary to develop an optimal radiation treatment process for the patient. The field placement simulation documents any change seen in overall tumor volume documented in the patient’s record, allows the clinician to indicate any needed changes in the treatment plan and/or prescription, provides diagnostic evaluation as the basis for performing the therapeutic procedure, and clearly identifies the information needed to decide to proceed with the therapeutic procedure. This process includes verification of the treatment port(s) and proper treatment positioning. All treatment ports were photographed within electronic medical records. The patient's lead blocking along with gross tumor volume and margin was confirmed. Considering superficial radiotherapy is clinical in setup, this requires the physician and radiation therapist to clarify the location interest being treated against initial images, ultrasound, pathology, and patient anatomy. Care was taken to ensure de la cruz treated were geometrically accurate and properly positioned using therapeutic radiology simulation-aided field setting verification per fraction. This process is also utilized to determine if any prescription or setup changes are necessary. These steps are therefore medically necessary to ensure safe and effective administration of radiation. Ongoing therapeutic radiology simulation-aided field setting verification is ordered throughout the course of therapy.\\n\\nA high-frequency ultrasound image was acquired today for a two-dimensional evaluation of the tumor volume, depth, width, breadth, review of prior response to treatment, provide geometric accuracy of field placement, and determine whether to proceed with therapeutic delivery. \\n\\nThe field placement and ultrasound imaging, per fraction, is separate and distinct from the initial simulation and is an important task in providing safe administration of superficial radiation therapy. Physician evaluation of the ultrasound information will be ongoing throughout the course of treatment and is deemed medically necessary to ensure the efficacy of treatment, whether to proceed with therapeutic delivery, and determine any necessary changes. Today's images were evaluated for determination of continuation of treatment with the current plan or with necessary changes as appropriate. Additionally, the use of ultrasound visualization and targeted assessment allows the patient to be able to see their cancer(s) progress, encouraging the patient to complete and maintain compliance through the full course of prescribed radiotherapy. Per Dr. Huynh, continued ultrasound guidance and therapeutic radiology simulation-aided field setting verification per fraction is required for field placement, measurement of tumor depth, tissue evaluation, progress, acute effect monitoring, and determination for therapeutic treatment delivery is appropriate.
Add X Modifier?: CHEN - Unusual Non-Overlapping Service
Additional Comments (Add Customization Of Note Here): Treatment area is showing faint erythema.
Prescription Used: 1

## 2024-07-22 NOTE — PROCEDURE: IMAGE GUIDED - SUPERFICIAL RADIOTHERAPY
Body Location Override (Optional): Right Cheek
Detail Level: Detailed
Field Number: 1
Lesion Dimensions-X Axis In Cm: 0.6
Lesion Dimensions-Y Axis In Cm: 0.7
Lesion Margin Size In Cm: 0.5
Shield Size In Cm: 1.7 x 1.7
Applicator Size In Cm: 2.0 cm
Energy (Kv): 70
Treatment Time (Min): 0.45
Time, Dose, Fractionation Factor (Tdf) For Prescription 1: 97
Daily Dose (Cgy): 274.05
Number Of Fractions For Prescription 1: 20
Treatments Per Week: 3
Total Dose For Prescription 1 (Cgy): 5441
Add A Second Prescription?: No
Additional Fraction(S) Needed:: 0
Bill For Physics Consultation: No - Render Text Only
Physics Documentation: (Physics Check Verbiage)

## 2024-07-23 ENCOUNTER — APPOINTMENT (OUTPATIENT)
Dept: URBAN - METROPOLITAN AREA CLINIC 250 | Age: 59
Setting detail: DERMATOLOGY
End: 2024-07-24

## 2024-07-23 PROBLEM — C44.319 BASAL CELL CARCINOMA OF SKIN OF OTHER PARTS OF FACE: Status: ACTIVE | Noted: 2024-07-23

## 2024-07-23 PROCEDURE — OTHER IMAGE GUIDED - SUPERFICIAL RADIOTHERAPY: TREATMENT VISIT: OTHER

## 2024-07-23 PROCEDURE — 77280 THER RAD SIMULAJ FIELD SMPL: CPT

## 2024-07-23 PROCEDURE — 77401 RADIATION TX DELIVERY SUPFC: CPT

## 2024-07-23 PROCEDURE — G6001 ECHO GUIDANCE RADIOTHERAPY: HCPCS | Mod: XU

## 2024-07-23 PROCEDURE — OTHER IMAGE GUIDED - SUPERFICIAL RADIOTHERAPY: OTHER

## 2024-07-23 NOTE — PROCEDURE: IMAGE GUIDED - SUPERFICIAL RADIOTHERAPY: TREATMENT VISIT
Ultrasound Used Text: High frequency ultrasound depth is 0.80 mm, which is 0.12 mm in difference from previous imaging.
Fraction Number: 7
Energy (Kv): 70
Bill For Simulation (Per Medicare, Typical Course Of Radiation Therapy Will Require Between One To Three Simulations): Yes- (Simple- 1 Site: 52493)
Ultrasound Not Used Text: Ultrasound was not performed today due to
Additional Change To Daily Dosage Administered Mid Treatment?: No
Daily Dosage (Cgy): 274.05
Bill For Treatment (32669)?: Yes
Calculate Total Cumulative Dose Automatically Or Manually: Manually
Total Cumulative Dose (Cgy): 1918.35
Treatment Documentation: This patient has been treated today with image-guided superficial radiation therapy for non-melanoma skin cancer. Written informed consent has been previously obtained from this patient for this treatment. This consent is documented in the patient's chart. The patient gave verbal consent to continue treatment today. The patient was treated with a specific radiation dose and setup as prescribed by the provider listed on this visit note. A Radiation Therapist performed administration of radiation under the supervision of a provider. The treatment parameters and cumulative dose are indicated above. Prior to administering the radiation, the patient underwent a verification therapeutic radiology simulation-aided field setting defining relevant normal and abnormal target anatomy and acquiring images with separate and distinct diagnostic high-frequency ultrasound to delineate tissues and determine whether to proceed with delivery of therapeutic, in addition to retrieve data necessary to develop an optimal radiation treatment process for the patient. The field placement simulation documents any change seen in overall tumor volume documented in the patient’s record, allows the clinician to indicate any needed changes in the treatment plan and/or prescription, provides diagnostic evaluation as the basis for performing the therapeutic procedure, and clearly identifies the information needed to decide to proceed with the therapeutic procedure. This process includes verification of the treatment port(s) and proper treatment positioning. All treatment ports were photographed within electronic medical records. The patient's lead blocking along with gross tumor volume and margin was confirmed. Considering superficial radiotherapy is clinical in setup, this requires the physician and radiation therapist to clarify the location interest being treated against initial images, ultrasound, pathology, and patient anatomy. Care was taken to ensure de la cruz treated were geometrically accurate and properly positioned using therapeutic radiology simulation-aided field setting verification per fraction. This process is also utilized to determine if any prescription or setup changes are necessary. These steps are therefore medically necessary to ensure safe and effective administration of radiation. Ongoing therapeutic radiology simulation-aided field setting verification is ordered throughout the course of therapy.\\n\\nA high-frequency ultrasound image was acquired today for a two-dimensional evaluation of the tumor volume, depth, width, breadth, review of prior response to treatment, provide geometric accuracy of field placement, and determine whether to proceed with therapeutic delivery. \\n\\nThe field placement and ultrasound imaging, per fraction, is separate and distinct from the initial simulation and is an important task in providing safe administration of superficial radiation therapy. Physician evaluation of the ultrasound information will be ongoing throughout the course of treatment and is deemed medically necessary to ensure the efficacy of treatment, whether to proceed with therapeutic delivery, and determine any necessary changes. Today's images were evaluated for determination of continuation of treatment with the current plan or with necessary changes as appropriate. Additionally, the use of ultrasound visualization and targeted assessment allows the patient to be able to see their cancer(s) progress, encouraging the patient to complete and maintain compliance through the full course of prescribed radiotherapy. Per Dr. Huynh, continued ultrasound guidance and therapeutic radiology simulation-aided field setting verification per fraction is required for field placement, measurement of tumor depth, tissue evaluation, progress, acute effect monitoring, and determination for therapeutic treatment delivery is appropriate.
Add X Modifier?: CHEN - Unusual Non-Overlapping Service
Additional Comments (Add Customization Of Note Here): Treatment area is showing faint erythema.  She is using cream daily as needed.
Prescription Used: 1

## 2024-07-23 NOTE — PROCEDURE: IMAGE GUIDED - SUPERFICIAL RADIOTHERAPY
Body Location Override (Optional): Right Cheek
Detail Level: Detailed
Field Number: 1
Lesion Dimensions-X Axis In Cm: 0.6
Lesion Dimensions-Y Axis In Cm: 0.7
Lesion Margin Size In Cm: 0.5
Shield Size In Cm: 1.7 x 1.7
Applicator Size In Cm: 2.0 cm
Energy (Kv): 70
Treatment Time (Min): 0.45
Time, Dose, Fractionation Factor (Tdf) For Prescription 1: 97
Daily Dose (Cgy): 274.05
Number Of Fractions For Prescription 1: 20
Treatments Per Week: 3
Total Dose For Prescription 1 (Cgy): 5408
Add A Second Prescription?: No
Additional Fraction(S) Needed:: 0
Bill For Physics Consultation: No - Render Text Only
Physics Documentation: (Physics Check Verbiage)

## 2024-07-29 ENCOUNTER — APPOINTMENT (OUTPATIENT)
Dept: URBAN - METROPOLITAN AREA CLINIC 250 | Age: 59
Setting detail: DERMATOLOGY
End: 2024-07-30

## 2024-07-29 PROBLEM — C44.319 BASAL CELL CARCINOMA OF SKIN OF OTHER PARTS OF FACE: Status: ACTIVE | Noted: 2024-07-29

## 2024-07-29 PROCEDURE — 77401 RADIATION TX DELIVERY SUPFC: CPT

## 2024-07-29 PROCEDURE — 77280 THER RAD SIMULAJ FIELD SMPL: CPT

## 2024-07-29 PROCEDURE — G6001 ECHO GUIDANCE RADIOTHERAPY: HCPCS | Mod: XU

## 2024-07-29 PROCEDURE — OTHER IMAGE GUIDED - SUPERFICIAL RADIOTHERAPY: TREATMENT VISIT: OTHER

## 2024-07-29 PROCEDURE — OTHER IMAGE GUIDED - SUPERFICIAL RADIOTHERAPY: OTHER

## 2024-07-29 NOTE — PROCEDURE: IMAGE GUIDED - SUPERFICIAL RADIOTHERAPY: TREATMENT VISIT
Ultrasound Used Text: High frequency ultrasound depth is 0.76 mm, which is 0.04 mm in difference from previous imaging.
Fraction Number: 8
Energy (Kv): 70
Bill For Simulation (Per Medicare, Typical Course Of Radiation Therapy Will Require Between One To Three Simulations): Yes- (Simple- 1 Site: 90636)
Ultrasound Not Used Text: Ultrasound was not performed today due to
Additional Change To Daily Dosage Administered Mid Treatment?: No
Daily Dosage (Cgy): 274.05
Bill For Treatment (97238)?: Yes
Calculate Total Cumulative Dose Automatically Or Manually: Manually
Total Cumulative Dose (Cgy): 2192.6
Treatment Documentation: This patient has been treated today with image-guided superficial radiation therapy for non-melanoma skin cancer. Written informed consent has been previously obtained from this patient for this treatment. This consent is documented in the patient's chart. The patient gave verbal consent to continue treatment today. The patient was treated with a specific radiation dose and setup as prescribed by the provider listed on this visit note. A Radiation Therapist performed administration of radiation under the supervision of a provider. The treatment parameters and cumulative dose are indicated above. Prior to administering the radiation, the patient underwent a verification therapeutic radiology simulation-aided field setting defining relevant normal and abnormal target anatomy and acquiring images with separate and distinct diagnostic high-frequency ultrasound to delineate tissues and determine whether to proceed with delivery of therapeutic, in addition to retrieve data necessary to develop an optimal radiation treatment process for the patient. The field placement simulation documents any change seen in overall tumor volume documented in the patient’s record, allows the clinician to indicate any needed changes in the treatment plan and/or prescription, provides diagnostic evaluation as the basis for performing the therapeutic procedure, and clearly identifies the information needed to decide to proceed with the therapeutic procedure. This process includes verification of the treatment port(s) and proper treatment positioning. All treatment ports were photographed within electronic medical records. The patient's lead blocking along with gross tumor volume and margin was confirmed. Considering superficial radiotherapy is clinical in setup, this requires the physician and radiation therapist to clarify the location interest being treated against initial images, ultrasound, pathology, and patient anatomy. Care was taken to ensure de la cruz treated were geometrically accurate and properly positioned using therapeutic radiology simulation-aided field setting verification per fraction. This process is also utilized to determine if any prescription or setup changes are necessary. These steps are therefore medically necessary to ensure safe and effective administration of radiation. Ongoing therapeutic radiology simulation-aided field setting verification is ordered throughout the course of therapy.\\n\\nA high-frequency ultrasound image was acquired today for a two-dimensional evaluation of the tumor volume, depth, width, breadth, review of prior response to treatment, provide geometric accuracy of field placement, and determine whether to proceed with therapeutic delivery. \\n\\nThe field placement and ultrasound imaging, per fraction, is separate and distinct from the initial simulation and is an important task in providing safe administration of superficial radiation therapy. Physician evaluation of the ultrasound information will be ongoing throughout the course of treatment and is deemed medically necessary to ensure the efficacy of treatment, whether to proceed with therapeutic delivery, and determine any necessary changes. Today's images were evaluated for determination of continuation of treatment with the current plan or with necessary changes as appropriate. Additionally, the use of ultrasound visualization and targeted assessment allows the patient to be able to see their cancer(s) progress, encouraging the patient to complete and maintain compliance through the full course of prescribed radiotherapy. Per Dr. Huynh, continued ultrasound guidance and therapeutic radiology simulation-aided field setting verification per fraction is required for field placement, measurement of tumor depth, tissue evaluation, progress, acute effect monitoring, and determination for therapeutic treatment delivery is appropriate.
Add X Modifier?: CHEN - Unusual Non-Overlapping Service
Additional Comments (Add Customization Of Note Here): Treatment area is showing  erythema.  She is using cream daily as needed.
Prescription Used: 1

## 2024-07-30 ENCOUNTER — APPOINTMENT (OUTPATIENT)
Dept: URBAN - METROPOLITAN AREA CLINIC 250 | Age: 59
Setting detail: DERMATOLOGY
End: 2024-07-30

## 2024-07-30 PROBLEM — C44.319 BASAL CELL CARCINOMA OF SKIN OF OTHER PARTS OF FACE: Status: ACTIVE | Noted: 2024-07-30

## 2024-07-30 PROCEDURE — OTHER IMAGE GUIDED - SUPERFICIAL RADIOTHERAPY: OTHER

## 2024-07-30 PROCEDURE — 77401 RADIATION TX DELIVERY SUPFC: CPT

## 2024-07-30 PROCEDURE — OTHER IMAGE GUIDED - SUPERFICIAL RADIOTHERAPY: TREATMENT VISIT: OTHER

## 2024-07-30 PROCEDURE — G6001 ECHO GUIDANCE RADIOTHERAPY: HCPCS | Mod: XU

## 2024-07-30 PROCEDURE — 77280 THER RAD SIMULAJ FIELD SMPL: CPT

## 2024-07-30 NOTE — PROCEDURE: IMAGE GUIDED - SUPERFICIAL RADIOTHERAPY
Body Location Override (Optional): Right Cheek
Detail Level: Detailed
Field Number: 1
Lesion Dimensions-X Axis In Cm: 0.6
Lesion Dimensions-Y Axis In Cm: 0.7
Lesion Margin Size In Cm: 0.5
Shield Size In Cm: 1.7 x 1.7
Applicator Size In Cm: 2.0 cm
Energy (Kv): 70
Treatment Time (Min): 0.45
Time, Dose, Fractionation Factor (Tdf) For Prescription 1: 97
Daily Dose (Cgy): 274.05
Number Of Fractions For Prescription 1: 20
Treatments Per Week: 3
Total Dose For Prescription 1 (Cgy): 54
Add A Second Prescription?: No
Additional Fraction(S) Needed:: 0
Bill For Physics Consultation: No - Render Text Only
Physics Documentation: (Physics Check Verbiage)

## 2024-07-30 NOTE — PROCEDURE: IMAGE GUIDED - SUPERFICIAL RADIOTHERAPY: TREATMENT VISIT
Ultrasound Used Text: High frequency ultrasound depth is 0.80 mm, which is 0.04 mm in difference from previous imaging.
Fraction Number: 9
Energy (Kv): 70
Bill For Simulation (Per Medicare, Typical Course Of Radiation Therapy Will Require Between One To Three Simulations): Yes- (Simple- 1 Site: 85015)
Ultrasound Not Used Text: Ultrasound was not performed today due to
Additional Change To Daily Dosage Administered Mid Treatment?: No
Daily Dosage (Cgy): 274.05
Bill For Treatment (38732)?: Yes
Calculate Total Cumulative Dose Automatically Or Manually: Manually
Total Cumulative Dose (Cgy): 2466.45
Treatment Documentation: This patient has been treated today with image-guided superficial radiation therapy for non-melanoma skin cancer. Written informed consent has been previously obtained from this patient for this treatment. This consent is documented in the patient's chart. The patient gave verbal consent to continue treatment today. The patient was treated with a specific radiation dose and setup as prescribed by the provider listed on this visit note. A Radiation Therapist performed administration of radiation under the supervision of a provider. The treatment parameters and cumulative dose are indicated above. Prior to administering the radiation, the patient underwent a verification therapeutic radiology simulation-aided field setting defining relevant normal and abnormal target anatomy and acquiring images with separate and distinct diagnostic high-frequency ultrasound to delineate tissues and determine whether to proceed with delivery of therapeutic, in addition to retrieve data necessary to develop an optimal radiation treatment process for the patient. The field placement simulation documents any change seen in overall tumor volume documented in the patient’s record, allows the clinician to indicate any needed changes in the treatment plan and/or prescription, provides diagnostic evaluation as the basis for performing the therapeutic procedure, and clearly identifies the information needed to decide to proceed with the therapeutic procedure. This process includes verification of the treatment port(s) and proper treatment positioning. All treatment ports were photographed within electronic medical records. The patient's lead blocking along with gross tumor volume and margin was confirmed. Considering superficial radiotherapy is clinical in setup, this requires the physician and radiation therapist to clarify the location interest being treated against initial images, ultrasound, pathology, and patient anatomy. Care was taken to ensure de la cruz treated were geometrically accurate and properly positioned using therapeutic radiology simulation-aided field setting verification per fraction. This process is also utilized to determine if any prescription or setup changes are necessary. These steps are therefore medically necessary to ensure safe and effective administration of radiation. Ongoing therapeutic radiology simulation-aided field setting verification is ordered throughout the course of therapy.\\n\\nA high-frequency ultrasound image was acquired today for a two-dimensional evaluation of the tumor volume, depth, width, breadth, review of prior response to treatment, provide geometric accuracy of field placement, and determine whether to proceed with therapeutic delivery. \\n\\nThe field placement and ultrasound imaging, per fraction, is separate and distinct from the initial simulation and is an important task in providing safe administration of superficial radiation therapy. Physician evaluation of the ultrasound information will be ongoing throughout the course of treatment and is deemed medically necessary to ensure the efficacy of treatment, whether to proceed with therapeutic delivery, and determine any necessary changes. Today's images were evaluated for determination of continuation of treatment with the current plan or with necessary changes as appropriate. Additionally, the use of ultrasound visualization and targeted assessment allows the patient to be able to see their cancer(s) progress, encouraging the patient to complete and maintain compliance through the full course of prescribed radiotherapy. Per Dr. Huynh, continued ultrasound guidance and therapeutic radiology simulation-aided field setting verification per fraction is required for field placement, measurement of tumor depth, tissue evaluation, progress, acute effect monitoring, and determination for therapeutic treatment delivery is appropriate.
Add X Modifier?: CHEN - Unusual Non-Overlapping Service
Additional Comments (Add Customization Of Note Here): Treatment area is showing  erythema.  She is using cream daily.
Prescription Used: 1

## 2024-08-06 ENCOUNTER — APPOINTMENT (OUTPATIENT)
Dept: URBAN - METROPOLITAN AREA CLINIC 250 | Age: 59
Setting detail: DERMATOLOGY
End: 2024-08-12

## 2024-08-06 PROBLEM — C44.319 BASAL CELL CARCINOMA OF SKIN OF OTHER PARTS OF FACE: Status: ACTIVE | Noted: 2024-08-06

## 2024-08-06 PROCEDURE — G6001 ECHO GUIDANCE RADIOTHERAPY: HCPCS | Mod: XU

## 2024-08-06 PROCEDURE — OTHER IMAGE GUIDED - SUPERFICIAL RADIOTHERAPY: OTHER

## 2024-08-06 PROCEDURE — OTHER IMAGE GUIDED - SUPERFICIAL RADIOTHERAPY: TREATMENT VISIT: OTHER

## 2024-08-06 PROCEDURE — 77280 THER RAD SIMULAJ FIELD SMPL: CPT

## 2024-08-06 PROCEDURE — 77401 RADIATION TX DELIVERY SUPFC: CPT

## 2024-08-06 NOTE — PROCEDURE: IMAGE GUIDED - SUPERFICIAL RADIOTHERAPY
Body Location Override (Optional): Right Cheek
Detail Level: Detailed
Field Number: 1
Lesion Dimensions-X Axis In Cm: 0.6
Lesion Dimensions-Y Axis In Cm: 0.7
Lesion Margin Size In Cm: 0.5
Shield Size In Cm: 1.7 x 1.7
Applicator Size In Cm: 2.0 cm
Energy (Kv): 70
Treatment Time (Min): 0.45
Time, Dose, Fractionation Factor (Tdf) For Prescription 1: 97
Daily Dose (Cgy): 274.05
Number Of Fractions For Prescription 1: 20
Treatments Per Week: 3
Total Dose For Prescription 1 (Cgy): 5409
Add A Second Prescription?: No
Additional Fraction(S) Needed:: 0
Bill For Physics Consultation: No - Render Text Only
Physics Documentation: (Physics Check Verbiage)

## 2024-08-07 ENCOUNTER — APPOINTMENT (OUTPATIENT)
Dept: URBAN - METROPOLITAN AREA CLINIC 250 | Age: 59
Setting detail: DERMATOLOGY
End: 2024-08-08

## 2024-08-07 PROBLEM — C44.319 BASAL CELL CARCINOMA OF SKIN OF OTHER PARTS OF FACE: Status: ACTIVE | Noted: 2024-08-07

## 2024-08-07 PROCEDURE — 77401 RADIATION TX DELIVERY SUPFC: CPT

## 2024-08-07 PROCEDURE — OTHER IMAGE GUIDED - SUPERFICIAL RADIOTHERAPY: TREATMENT VISIT: OTHER

## 2024-08-07 PROCEDURE — G6001 ECHO GUIDANCE RADIOTHERAPY: HCPCS | Mod: XU

## 2024-08-07 PROCEDURE — OTHER IMAGE GUIDED - SUPERFICIAL RADIOTHERAPY: OTHER

## 2024-08-07 PROCEDURE — 77280 THER RAD SIMULAJ FIELD SMPL: CPT

## 2024-08-07 NOTE — PROCEDURE: IMAGE GUIDED - SUPERFICIAL RADIOTHERAPY
Body Location Override (Optional): Right Cheek
Detail Level: Detailed
Field Number: 1
Lesion Dimensions-X Axis In Cm: 0.6
Lesion Dimensions-Y Axis In Cm: 0.7
Lesion Margin Size In Cm: 0.5
Shield Size In Cm: 1.7 x 1.7
Applicator Size In Cm: 2.0 cm
Energy (Kv): 70
Treatment Time (Min): 0.45
Time, Dose, Fractionation Factor (Tdf) For Prescription 1: 97
Daily Dose (Cgy): 274.05
Number Of Fractions For Prescription 1: 20
Treatments Per Week: 3
Total Dose For Prescription 1 (Cgy): 5456
Add A Second Prescription?: No
Additional Fraction(S) Needed:: 0
Bill For Physics Consultation: No - Render Text Only
Physics Documentation: (Physics Check Verbiage)

## 2024-08-07 NOTE — PROCEDURE: IMAGE GUIDED - SUPERFICIAL RADIOTHERAPY: TREATMENT VISIT
Ultrasound Used Text: High frequency ultrasound depth is 1.02 mm, which is 0.18 mm in difference from previous imaging.
Fraction Number: 11
Energy (Kv): 70
Bill For Simulation (Per Medicare, Typical Course Of Radiation Therapy Will Require Between One To Three Simulations): Yes- (Simple- 1 Site: 04912)
Ultrasound Not Used Text: Ultrasound was not performed today due to
Additional Change To Daily Dosage Administered Mid Treatment?: No
Daily Dosage (Cgy): 274.05
Bill For Treatment (49505)?: Yes
Calculate Total Cumulative Dose Automatically Or Manually: Manually
Total Cumulative Dose (Cgy): 3014.55
Treatment Documentation: This patient has been treated today with image-guided superficial radiation therapy for non-melanoma skin cancer. Written informed consent has been previously obtained from this patient for this treatment. This consent is documented in the patient's chart. The patient gave verbal consent to continue treatment today. The patient was treated with a specific radiation dose and setup as prescribed by the provider listed on this visit note. A Radiation Therapist performed administration of radiation under the supervision of a provider. The treatment parameters and cumulative dose are indicated above. Prior to administering the radiation, the patient underwent a verification therapeutic radiology simulation-aided field setting defining relevant normal and abnormal target anatomy and acquiring images with separate and distinct diagnostic high-frequency ultrasound to delineate tissues and determine whether to proceed with delivery of therapeutic, in addition to retrieve data necessary to develop an optimal radiation treatment process for the patient. The field placement simulation documents any change seen in overall tumor volume documented in the patient’s record, allows the clinician to indicate any needed changes in the treatment plan and/or prescription, provides diagnostic evaluation as the basis for performing the therapeutic procedure, and clearly identifies the information needed to decide to proceed with the therapeutic procedure. This process includes verification of the treatment port(s) and proper treatment positioning. All treatment ports were photographed within electronic medical records. The patient's lead blocking along with gross tumor volume and margin was confirmed. Considering superficial radiotherapy is clinical in setup, this requires the physician and radiation therapist to clarify the location interest being treated against initial images, ultrasound, pathology, and patient anatomy. Care was taken to ensure de la cruz treated were geometrically accurate and properly positioned using therapeutic radiology simulation-aided field setting verification per fraction. This process is also utilized to determine if any prescription or setup changes are necessary. These steps are therefore medically necessary to ensure safe and effective administration of radiation. Ongoing therapeutic radiology simulation-aided field setting verification is ordered throughout the course of therapy.\\n\\nA high-frequency ultrasound image was acquired today for a two-dimensional evaluation of the tumor volume, depth, width, breadth, review of prior response to treatment, provide geometric accuracy of field placement, and determine whether to proceed with therapeutic delivery. \\n\\nThe field placement and ultrasound imaging, per fraction, is separate and distinct from the initial simulation and is an important task in providing safe administration of superficial radiation therapy. Physician evaluation of the ultrasound information will be ongoing throughout the course of treatment and is deemed medically necessary to ensure the efficacy of treatment, whether to proceed with therapeutic delivery, and determine any necessary changes. Today's images were evaluated for determination of continuation of treatment with the current plan or with necessary changes as appropriate. Additionally, the use of ultrasound visualization and targeted assessment allows the patient to be able to see their cancer(s) progress, encouraging the patient to complete and maintain compliance through the full course of prescribed radiotherapy. Per Dr. Huynh, continued ultrasound guidance and therapeutic radiology simulation-aided field setting verification per fraction is required for field placement, measurement of tumor depth, tissue evaluation, progress, acute effect monitoring, and determination for therapeutic treatment delivery is appropriate.
Add X Modifier?: CHEN - Unusual Non-Overlapping Service
Additional Comments (Add Customization Of Note Here): Treatment area is showing  erythema.  She is using cream daily.
Prescription Used: 1

## 2024-08-08 ENCOUNTER — APPOINTMENT (OUTPATIENT)
Dept: URBAN - METROPOLITAN AREA CLINIC 250 | Age: 59
Setting detail: DERMATOLOGY
End: 2024-08-09

## 2024-08-08 PROBLEM — C44.319 BASAL CELL CARCINOMA OF SKIN OF OTHER PARTS OF FACE: Status: ACTIVE | Noted: 2024-08-08

## 2024-08-08 PROCEDURE — 77401 RADIATION TX DELIVERY SUPFC: CPT

## 2024-08-08 PROCEDURE — OTHER IMAGE GUIDED - SUPERFICIAL RADIOTHERAPY: OTHER

## 2024-08-08 PROCEDURE — 99213 OFFICE O/P EST LOW 20 MIN: CPT | Mod: 25

## 2024-08-08 PROCEDURE — OTHER IMAGE GUIDED - SUPERFICIAL RADIOTHERAPY: EVALUATION VISIT: OTHER

## 2024-08-08 PROCEDURE — 77280 THER RAD SIMULAJ FIELD SMPL: CPT

## 2024-08-08 PROCEDURE — G6001 ECHO GUIDANCE RADIOTHERAPY: HCPCS | Mod: XU

## 2024-08-08 PROCEDURE — OTHER IMAGE GUIDED - SUPERFICIAL RADIOTHERAPY: TREATMENT VISIT: OTHER

## 2024-08-08 NOTE — PROCEDURE: IMAGE GUIDED - SUPERFICIAL RADIOTHERAPY: TREATMENT VISIT
Ultrasound Used Text: High frequency ultrasound depth is 0.94 mm, which is 0.08 mm in difference from previous imaging.
Fraction Number: 12
Energy (Kv): 70
Bill For Simulation (Per Medicare, Typical Course Of Radiation Therapy Will Require Between One To Three Simulations): Yes- (Simple- 1 Site: 96159)
Ultrasound Not Used Text: Ultrasound was not performed today due to
Additional Change To Daily Dosage Administered Mid Treatment?: No
Daily Dosage (Cgy): 274.05
Bill For Treatment ([id number])?: Yes
Calculate Total Cumulative Dose Automatically Or Manually: Manually
Total Cumulative Dose (Cgy): 3288.6
Treatment Documentation: This patient has been treated today with image-guided superficial radiation therapy for non-melanoma skin cancer. Written informed consent has been previously obtained from this patient for this treatment. This consent is documented in the patient's chart. The patient gave verbal consent to continue treatment today. The patient was treated with a specific radiation dose and setup as prescribed by the provider listed on this visit note. A Radiation Therapist performed administration of radiation under the supervision of a provider. The treatment parameters and cumulative dose are indicated above. Prior to administering the radiation, the patient underwent a verification therapeutic radiology simulation-aided field setting defining relevant normal and abnormal target anatomy and acquiring images with separate and distinct diagnostic high-frequency ultrasound to delineate tissues and determine whether to proceed with delivery of therapeutic, in addition to retrieve data necessary to develop an optimal radiation treatment process for the patient. The field placement simulation documents any change seen in overall tumor volume documented in the patient’s record, allows the clinician to indicate any needed changes in the treatment plan and/or prescription, provides diagnostic evaluation as the basis for performing the therapeutic procedure, and clearly identifies the information needed to decide to proceed with the therapeutic procedure. This process includes verification of the treatment port(s) and proper treatment positioning. All treatment ports were photographed within electronic medical records. The patient's lead blocking along with gross tumor volume and margin was confirmed. Considering superficial radiotherapy is clinical in setup, this requires the physician and radiation therapist to clarify the location interest being treated against initial images, ultrasound, pathology, and patient anatomy. Care was taken to ensure de la cruz treated were geometrically accurate and properly positioned using therapeutic radiology simulation-aided field setting verification per fraction. This process is also utilized to determine if any prescription or setup changes are necessary. These steps are therefore medically necessary to ensure safe and effective administration of radiation. Ongoing therapeutic radiology simulation-aided field setting verification is ordered throughout the course of therapy.\\n\\nA high-frequency ultrasound image was acquired today for a two-dimensional evaluation of the tumor volume, depth, width, breadth, review of prior response to treatment, provide geometric accuracy of field placement, and determine whether to proceed with therapeutic delivery. \\n\\nThe field placement and ultrasound imaging, per fraction, is separate and distinct from the initial simulation and is an important task in providing safe administration of superficial radiation therapy. Physician evaluation of the ultrasound information will be ongoing throughout the course of treatment and is deemed medically necessary to ensure the efficacy of treatment, whether to proceed with therapeutic delivery, and determine any necessary changes. Today's images were evaluated for determination of continuation of treatment with the current plan or with necessary changes as appropriate. Additionally, the use of ultrasound visualization and targeted assessment allows the patient to be able to see their cancer(s) progress, encouraging the patient to complete and maintain compliance through the full course of prescribed radiotherapy. Per Dr. Huynh, continued ultrasound guidance and therapeutic radiology simulation-aided field setting verification per fraction is required for field placement, measurement of tumor depth, tissue evaluation, progress, acute effect monitoring, and determination for therapeutic treatment delivery is appropriate.
Add X Modifier?: CHEN - Unusual Non-Overlapping Service
Additional Comments (Add Customization Of Note Here): Treatment area is showing  erythema.  She is using cream daily with no concerns.
Prescription Used: 1

## 2024-08-08 NOTE — PROCEDURE: IMAGE GUIDED - SUPERFICIAL RADIOTHERAPY: EVALUATION VISIT
Assessment: Appropriate reaction
Radiation Therapy Oncology [facility] (Rtog) Score: 2
Evaluation Plan: The patient is undergoing superficial radiation therapy for skin cancer and presents for weekly evaluation and management. Per protocol and as documented on the flow sheet, the patient was questioned as to subjective redness, pruritus, pain, drainage, fatigue, or any other symptoms. Objectively, the radiation area was evaluated with regards to erythema, atrophy, scale, crusting, erosion, ulceration, edema, purpura, tenderness, warmth, drainage, and any other findings. The plan was extensively reviewed including dose and dosing schedule. The simulation and clinical setup were also reviewed as were external and any internal shields and based on this review the appropriateness and sufficiency of treatment was determined.\\n\\nA high-frequency ultrasound image was acquired today for a two-dimensional evaluation of the tumor volume, depth, width, breadth, review of prior response to treatment, provide geometric accuracy of field placement, and determine whether to proceed with therapeutic delivery.\\n\\nPer Dr. Huynh, continued ultrasound guidance and therapeutic radiology simulation-aided field setting verification per fraction is required for field placement, measurement of tumor depth, tissues evaluation, progress, acute effect monitoring, and determination for therapeutic treatment delivery is appropriate.
Bill For Evaluation (20175)?: No
Ultrasound Used Text: Today's ultrasound showed no evidence of disease.
Follow Up Plan: Per the request of Dr. Huynh, patient was seen today for a 12 week follow up for Superficial Radiation Therapy. Physician evaluation of the ultrasound tumor depth, width, and breadth was ongoing through course of treatment and is deemed medically necessary ensuring efficacy of treatment. All appropriate blocking and treatment parameters verified by radiation therapist according to initial simulation. A high frequency ultrasound image was acquired today for two-dimensional evaluation of treatment volume and response to treatment, in addition, geometric accuracy of field placement. Today’s image was evaluated, lesion not detected on US.\\n\\nObjectively, the treated radiation area was evaluated with regards to erythema, atrophy, scale, crusting, erosion, ulceration, edema, purpura, tenderness, warmth, drainage, and any other finding. Patient to follow up for routine visits.
Ultrasound Not Used Text: Ultrasound was not performed today due to
Is This Visit For Evaluation During Treatment Or Follow Up Post Treatment?: evaluation
Additional Comments (Add Customization Of Note Here): She was doing well today.

## 2024-08-10 ENCOUNTER — APPOINTMENT (OUTPATIENT)
Dept: URBAN - METROPOLITAN AREA CLINIC 250 | Age: 59
Setting detail: DERMATOLOGY
End: 2024-10-15

## 2024-08-10 PROBLEM — C44.319 BASAL CELL CARCINOMA OF SKIN OF OTHER PARTS OF FACE: Status: ACTIVE | Noted: 2024-08-10

## 2024-08-10 PROCEDURE — 77336 RADIATION PHYSICS CONSULT: CPT

## 2024-08-10 PROCEDURE — OTHER IMAGE GUIDED - SUPERFICIAL RADIOTHERAPY: OTHER

## 2024-08-10 NOTE — PROCEDURE: IMAGE GUIDED - SUPERFICIAL RADIOTHERAPY
Body Location Override (Optional): Right Cheek
Detail Level: Detailed
Field Number: 1
Lesion Dimensions-X Axis In Cm: 0.6
Lesion Dimensions-Y Axis In Cm: 0.7
Lesion Margin Size In Cm: 0.5
Shield Size In Cm: 1.7 x 1.7
Applicator Size In Cm: 2.0 cm
Energy (Kv): 70
Treatment Time (Min): 0.45
Time, Dose, Fractionation Factor (Tdf) For Prescription 1: 97
Daily Dose (Cgy): 274.05
Number Of Fractions For Prescription 1: 20
Treatments Per Week: 3
Total Dose For Prescription 1 (Cgy): 5408
Add A Second Prescription?: No
Add Decay And Dose Adjustment Calculation?: Yes
Treatment Date (Start): 07/09/2024
Treatment Date (Stop): 08/16/2024
Treatment Date (Resume): 08/27/2024
Tdf Of Fractions Delivered: 69.5
Decay Factor: 0.98
Adjusted Tdf: 68.11
Remaining Tdf: 30
New Total Tdf (Remaining Tdf + Adjusted Tdf): 98.11
Additional Fraction(S) Needed:: 0
Dose Decay Verbiage For Visit Day Calculation: Per the request of Dr. Huynh dose decay calculation for this patient was performed due to break in treatment. Dose decay calculation is clinically necessary to determine appropriate changes to prescription, if necessary, for efficacy of treatment.\\n\\nPer Dr. Huynh, no prescription change was made today.
Physics Consultation Performed For Fractions:: 6-10
Physics Documentation: Per the request of Dr. Huynh, continuing medical physics review as per radiotherapy standard of care post every 5th fraction for patient, including assessment of treatment parameters,  of dose delivery, and review of patient treatment documentation in support of the provider, to ensure efficacy and continued safe delivery of radiotherapy. Included in physics check is review of patient setup information, all pertinent simulation and treatment photographs checks, prescription, dose calculation verification, per fraction dose charted correctly, elapsed days and treatment days correctly charted, cumulative dose correct, and review of any prescription changes. Patient was not present, nor was it necessary for the patient to be present for weekly physics review and no other superficial radiotherapy services were rendered on this day. Continued medical physics review post every 5th fraction of therapy is requested by provider for appropriate radiotherapy management and is deemed medically necessary and standard of care.

## 2024-08-14 ENCOUNTER — APPOINTMENT (OUTPATIENT)
Dept: URBAN - METROPOLITAN AREA CLINIC 250 | Age: 59
Setting detail: DERMATOLOGY
End: 2024-08-18

## 2024-08-14 PROBLEM — C44.319 BASAL CELL CARCINOMA OF SKIN OF OTHER PARTS OF FACE: Status: ACTIVE | Noted: 2024-08-14

## 2024-08-14 PROCEDURE — 77401 RADIATION TX DELIVERY SUPFC: CPT

## 2024-08-14 PROCEDURE — OTHER IMAGE GUIDED - SUPERFICIAL RADIOTHERAPY: OTHER

## 2024-08-14 PROCEDURE — 77280 THER RAD SIMULAJ FIELD SMPL: CPT

## 2024-08-14 PROCEDURE — OTHER IMAGE GUIDED - SUPERFICIAL RADIOTHERAPY: TREATMENT VISIT: OTHER

## 2024-08-14 PROCEDURE — G6001 ECHO GUIDANCE RADIOTHERAPY: HCPCS | Mod: XU

## 2024-08-14 NOTE — PROCEDURE: IMAGE GUIDED - SUPERFICIAL RADIOTHERAPY: TREATMENT VISIT
Ultrasound Used Text: High frequency ultrasound depth is 0.90 mm, which is 0.04 mm in difference from previous imaging.
Fraction Number: 13
Energy (Kv): 70
Bill For Simulation (Per Medicare, Typical Course Of Radiation Therapy Will Require Between One To Three Simulations): Yes- (Simple- 1 Site: 35670)
Ultrasound Not Used Text: Ultrasound was not performed today due to
Additional Change To Daily Dosage Administered Mid Treatment?: No
Daily Dosage (Cgy): 274.05
Bill For Treatment (47412)?: Yes
Calculate Total Cumulative Dose Automatically Or Manually: Manually
Total Cumulative Dose (Cgy): 3562.65
Treatment Documentation: This patient has been treated today with image-guided superficial radiation therapy for non-melanoma skin cancer. Written informed consent has been previously obtained from this patient for this treatment. This consent is documented in the patient's chart. The patient gave verbal consent to continue treatment today. The patient was treated with a specific radiation dose and setup as prescribed by the provider listed on this visit note. A Radiation Therapist performed administration of radiation under the supervision of a provider. The treatment parameters and cumulative dose are indicated above. Prior to administering the radiation, the patient underwent a verification therapeutic radiology simulation-aided field setting defining relevant normal and abnormal target anatomy and acquiring images with separate and distinct diagnostic high-frequency ultrasound to delineate tissues and determine whether to proceed with delivery of therapeutic, in addition to retrieve data necessary to develop an optimal radiation treatment process for the patient. The field placement simulation documents any change seen in overall tumor volume documented in the patient’s record, allows the clinician to indicate any needed changes in the treatment plan and/or prescription, provides diagnostic evaluation as the basis for performing the therapeutic procedure, and clearly identifies the information needed to decide to proceed with the therapeutic procedure. This process includes verification of the treatment port(s) and proper treatment positioning. All treatment ports were photographed within electronic medical records. The patient's lead blocking along with gross tumor volume and margin was confirmed. Considering superficial radiotherapy is clinical in setup, this requires the physician and radiation therapist to clarify the location interest being treated against initial images, ultrasound, pathology, and patient anatomy. Care was taken to ensure de la cruz treated were geometrically accurate and properly positioned using therapeutic radiology simulation-aided field setting verification per fraction. This process is also utilized to determine if any prescription or setup changes are necessary. These steps are therefore medically necessary to ensure safe and effective administration of radiation. Ongoing therapeutic radiology simulation-aided field setting verification is ordered throughout the course of therapy.\\n\\nA high-frequency ultrasound image was acquired today for a two-dimensional evaluation of the tumor volume, depth, width, breadth, review of prior response to treatment, provide geometric accuracy of field placement, and determine whether to proceed with therapeutic delivery. \\n\\nThe field placement and ultrasound imaging, per fraction, is separate and distinct from the initial simulation and is an important task in providing safe administration of superficial radiation therapy. Physician evaluation of the ultrasound information will be ongoing throughout the course of treatment and is deemed medically necessary to ensure the efficacy of treatment, whether to proceed with therapeutic delivery, and determine any necessary changes. Today's images were evaluated for determination of continuation of treatment with the current plan or with necessary changes as appropriate. Additionally, the use of ultrasound visualization and targeted assessment allows the patient to be able to see their cancer(s) progress, encouraging the patient to complete and maintain compliance through the full course of prescribed radiotherapy. Per Dr. Huynh, continued ultrasound guidance and therapeutic radiology simulation-aided field setting verification per fraction is required for field placement, measurement of tumor depth, tissue evaluation, progress, acute effect monitoring, and determination for therapeutic treatment delivery is appropriate.
Add X Modifier?: CHEN - Unusual Non-Overlapping Service
Additional Comments (Add Customization Of Note Here): Treatment area is showing  erythema.   She had all concerns addressed.
Prescription Used: 1

## 2024-08-14 NOTE — PROCEDURE: IMAGE GUIDED - SUPERFICIAL RADIOTHERAPY
Body Location Override (Optional): Right Cheek
Detail Level: Detailed
Field Number: 1
Lesion Dimensions-X Axis In Cm: 0.6
Lesion Dimensions-Y Axis In Cm: 0.7
Lesion Margin Size In Cm: 0.5
Shield Size In Cm: 1.7 x 1.7
Applicator Size In Cm: 2.0 cm
Energy (Kv): 70
Treatment Time (Min): 0.45
Time, Dose, Fractionation Factor (Tdf) For Prescription 1: 97
Daily Dose (Cgy): 274.05
Number Of Fractions For Prescription 1: 20
Treatments Per Week: 3
Total Dose For Prescription 1 (Cgy): 5405
Add A Second Prescription?: No
Additional Fraction(S) Needed:: 0
Bill For Physics Consultation: No - Render Text Only
Physics Documentation: (Physics Check Verbiage)

## 2024-08-15 ENCOUNTER — APPOINTMENT (OUTPATIENT)
Dept: URBAN - METROPOLITAN AREA CLINIC 250 | Age: 59
Setting detail: DERMATOLOGY
End: 2024-08-18

## 2024-08-15 PROBLEM — C44.319 BASAL CELL CARCINOMA OF SKIN OF OTHER PARTS OF FACE: Status: ACTIVE | Noted: 2024-08-15

## 2024-08-15 PROCEDURE — OTHER IMAGE GUIDED - SUPERFICIAL RADIOTHERAPY: TREATMENT VISIT: OTHER

## 2024-08-15 PROCEDURE — G6001 ECHO GUIDANCE RADIOTHERAPY: HCPCS | Mod: XU

## 2024-08-15 PROCEDURE — 77401 RADIATION TX DELIVERY SUPFC: CPT

## 2024-08-15 PROCEDURE — OTHER IMAGE GUIDED - SUPERFICIAL RADIOTHERAPY: OTHER

## 2024-08-15 PROCEDURE — 77280 THER RAD SIMULAJ FIELD SMPL: CPT

## 2024-08-15 NOTE — PROCEDURE: IMAGE GUIDED - SUPERFICIAL RADIOTHERAPY
Body Location Override (Optional): Right Cheek
Detail Level: Detailed
Field Number: 1
Lesion Dimensions-X Axis In Cm: 0.6
Lesion Dimensions-Y Axis In Cm: 0.7
Lesion Margin Size In Cm: 0.5
Shield Size In Cm: 1.7 x 1.7
Applicator Size In Cm: 2.0 cm
Energy (Kv): 70
Treatment Time (Min): 0.45
Time, Dose, Fractionation Factor (Tdf) For Prescription 1: 97
Daily Dose (Cgy): 274.05
Number Of Fractions For Prescription 1: 20
Treatments Per Week: 3
Total Dose For Prescription 1 (Cgy): 5451
Add A Second Prescription?: No
Additional Fraction(S) Needed:: 0
Bill For Physics Consultation: No - Render Text Only
Physics Documentation: (Physics Check Verbiage)

## 2024-08-15 NOTE — PROCEDURE: IMAGE GUIDED - SUPERFICIAL RADIOTHERAPY: TREATMENT VISIT
Ultrasound Used Text: High frequency ultrasound depth is 0.84 mm, which is 0.06 mm in difference from previous imaging.
Fraction Number: 14
Energy (Kv): 70
Bill For Simulation (Per Medicare, Typical Course Of Radiation Therapy Will Require Between One To Three Simulations): Yes- (Simple- 1 Site: 30016)
Ultrasound Not Used Text: Ultrasound was not performed today due to
Additional Change To Daily Dosage Administered Mid Treatment?: No
Daily Dosage (Cgy): 274.05
Bill For Treatment (09676)?: Yes
Calculate Total Cumulative Dose Automatically Or Manually: Manually
Total Cumulative Dose (Cgy): 3836.7
Treatment Documentation: This patient has been treated today with image-guided superficial radiation therapy for non-melanoma skin cancer. Written informed consent has been previously obtained from this patient for this treatment. This consent is documented in the patient's chart. The patient gave verbal consent to continue treatment today. The patient was treated with a specific radiation dose and setup as prescribed by the provider listed on this visit note. A Radiation Therapist performed administration of radiation under the supervision of a provider. The treatment parameters and cumulative dose are indicated above. Prior to administering the radiation, the patient underwent a verification therapeutic radiology simulation-aided field setting defining relevant normal and abnormal target anatomy and acquiring images with separate and distinct diagnostic high-frequency ultrasound to delineate tissues and determine whether to proceed with delivery of therapeutic, in addition to retrieve data necessary to develop an optimal radiation treatment process for the patient. The field placement simulation documents any change seen in overall tumor volume documented in the patient’s record, allows the clinician to indicate any needed changes in the treatment plan and/or prescription, provides diagnostic evaluation as the basis for performing the therapeutic procedure, and clearly identifies the information needed to decide to proceed with the therapeutic procedure. This process includes verification of the treatment port(s) and proper treatment positioning. All treatment ports were photographed within electronic medical records. The patient's lead blocking along with gross tumor volume and margin was confirmed. Considering superficial radiotherapy is clinical in setup, this requires the physician and radiation therapist to clarify the location interest being treated against initial images, ultrasound, pathology, and patient anatomy. Care was taken to ensure de la cruz treated were geometrically accurate and properly positioned using therapeutic radiology simulation-aided field setting verification per fraction. This process is also utilized to determine if any prescription or setup changes are necessary. These steps are therefore medically necessary to ensure safe and effective administration of radiation. Ongoing therapeutic radiology simulation-aided field setting verification is ordered throughout the course of therapy.\\n\\nA high-frequency ultrasound image was acquired today for a two-dimensional evaluation of the tumor volume, depth, width, breadth, review of prior response to treatment, provide geometric accuracy of field placement, and determine whether to proceed with therapeutic delivery. \\n\\nThe field placement and ultrasound imaging, per fraction, is separate and distinct from the initial simulation and is an important task in providing safe administration of superficial radiation therapy. Physician evaluation of the ultrasound information will be ongoing throughout the course of treatment and is deemed medically necessary to ensure the efficacy of treatment, whether to proceed with therapeutic delivery, and determine any necessary changes. Today's images were evaluated for determination of continuation of treatment with the current plan or with necessary changes as appropriate. Additionally, the use of ultrasound visualization and targeted assessment allows the patient to be able to see their cancer(s) progress, encouraging the patient to complete and maintain compliance through the full course of prescribed radiotherapy. Per Dr. Huynh, continued ultrasound guidance and therapeutic radiology simulation-aided field setting verification per fraction is required for field placement, measurement of tumor depth, tissue evaluation, progress, acute effect monitoring, and determination for therapeutic treatment delivery is appropriate.
Add X Modifier?: CHEN - Unusual Non-Overlapping Service
Additional Comments (Add Customization Of Note Here): Treatment area is showing  erythema.   She had all concerns addressed and will continue to use ointment daily.
Prescription Used: 1

## 2024-08-27 ENCOUNTER — APPOINTMENT (OUTPATIENT)
Age: 59
Setting detail: DERMATOLOGY
End: 2024-08-28

## 2024-08-27 PROBLEM — C44.319 BASAL CELL CARCINOMA OF SKIN OF OTHER PARTS OF FACE: Status: ACTIVE | Noted: 2024-08-27

## 2024-08-27 PROCEDURE — 77280 THER RAD SIMULAJ FIELD SMPL: CPT

## 2024-08-27 PROCEDURE — 77401 RADIATION TX DELIVERY SUPFC: CPT

## 2024-08-27 PROCEDURE — OTHER IMAGE GUIDED - SUPERFICIAL RADIOTHERAPY: TREATMENT VISIT: OTHER

## 2024-08-27 PROCEDURE — OTHER IMAGE GUIDED - SUPERFICIAL RADIOTHERAPY: OTHER

## 2024-08-27 PROCEDURE — G6001 ECHO GUIDANCE RADIOTHERAPY: HCPCS | Mod: XU

## 2024-08-27 NOTE — PROCEDURE: IMAGE GUIDED - SUPERFICIAL RADIOTHERAPY
Body Location Override (Optional): Right Cheek
Detail Level: Detailed
Field Number: 1
Lesion Dimensions-X Axis In Cm: 0.6
Lesion Dimensions-Y Axis In Cm: 0.7
Lesion Margin Size In Cm: 0.5
Shield Size In Cm: 1.7 x 1.7
Applicator Size In Cm: 2.0 cm
Energy (Kv): 70
Treatment Time (Min): 0.45
Time, Dose, Fractionation Factor (Tdf) For Prescription 1: 97
Daily Dose (Cgy): 274.05
Number Of Fractions For Prescription 1: 20
Treatments Per Week: 3
Total Dose For Prescription 1 (Cgy): 5458
Add A Second Prescription?: No
Additional Fraction(S) Needed:: 0
Bill For Physics Consultation: No - Render Text Only
Physics Documentation: (Physics Check Verbiage)

## 2024-08-27 NOTE — PROCEDURE: IMAGE GUIDED - SUPERFICIAL RADIOTHERAPY: TREATMENT VISIT
Ultrasound Used Text: High frequency ultrasound depth is 0.86 mm, which is 0.02 mm in difference from previous imaging.
Fraction Number: 15
Energy (Kv): 70
Bill For Simulation (Per Medicare, Typical Course Of Radiation Therapy Will Require Between One To Three Simulations): Yes- (Simple- 1 Site: 74968)
Ultrasound Not Used Text: Ultrasound was not performed today due to
Additional Change To Daily Dosage Administered Mid Treatment?: No
Daily Dosage (Cgy): 274.05
Bill For Treatment (18336)?: Yes
Calculate Total Cumulative Dose Automatically Or Manually: Manually
Total Cumulative Dose (Cgy): 4110.75
Treatment Documentation: This patient has been treated today with image-guided superficial radiation therapy for non-melanoma skin cancer. Written informed consent has been previously obtained from this patient for this treatment. This consent is documented in the patient's chart. The patient gave verbal consent to continue treatment today. The patient was treated with a specific radiation dose and setup as prescribed by the provider listed on this visit note. A Radiation Therapist performed administration of radiation under the supervision of a provider. The treatment parameters and cumulative dose are indicated above. Prior to administering the radiation, the patient underwent a verification therapeutic radiology simulation-aided field setting defining relevant normal and abnormal target anatomy and acquiring images with separate and distinct diagnostic high-frequency ultrasound to delineate tissues and determine whether to proceed with delivery of therapeutic, in addition to retrieve data necessary to develop an optimal radiation treatment process for the patient. The field placement simulation documents any change seen in overall tumor volume documented in the patient’s record, allows the clinician to indicate any needed changes in the treatment plan and/or prescription, provides diagnostic evaluation as the basis for performing the therapeutic procedure, and clearly identifies the information needed to decide to proceed with the therapeutic procedure. This process includes verification of the treatment port(s) and proper treatment positioning. All treatment ports were photographed within electronic medical records. The patient's lead blocking along with gross tumor volume and margin was confirmed. Considering superficial radiotherapy is clinical in setup, this requires the physician and radiation therapist to clarify the location interest being treated against initial images, ultrasound, pathology, and patient anatomy. Care was taken to ensure de la cruz treated were geometrically accurate and properly positioned using therapeutic radiology simulation-aided field setting verification per fraction. This process is also utilized to determine if any prescription or setup changes are necessary. These steps are therefore medically necessary to ensure safe and effective administration of radiation. Ongoing therapeutic radiology simulation-aided field setting verification is ordered throughout the course of therapy.\\n\\nA high-frequency ultrasound image was acquired today for a two-dimensional evaluation of the tumor volume, depth, width, breadth, review of prior response to treatment, provide geometric accuracy of field placement, and determine whether to proceed with therapeutic delivery. \\n\\nThe field placement and ultrasound imaging, per fraction, is separate and distinct from the initial simulation and is an important task in providing safe administration of superficial radiation therapy. Physician evaluation of the ultrasound information will be ongoing throughout the course of treatment and is deemed medically necessary to ensure the efficacy of treatment, whether to proceed with therapeutic delivery, and determine any necessary changes. Today's images were evaluated for determination of continuation of treatment with the current plan or with necessary changes as appropriate. Additionally, the use of ultrasound visualization and targeted assessment allows the patient to be able to see their cancer(s) progress, encouraging the patient to complete and maintain compliance through the full course of prescribed radiotherapy. Per Dr. Huynh, continued ultrasound guidance and therapeutic radiology simulation-aided field setting verification per fraction is required for field placement, measurement of tumor depth, tissue evaluation, progress, acute effect monitoring, and determination for therapeutic treatment delivery is appropriate.
Add X Modifier?: CHEN - Unusual Non-Overlapping Service
Additional Comments (Add Customization Of Note Here): Treatment area is showing  erythema.   She will continue to use ointment daily.
Prescription Used: 1

## 2024-08-28 ENCOUNTER — APPOINTMENT (OUTPATIENT)
Age: 59
Setting detail: DERMATOLOGY
End: 2024-08-29

## 2024-08-28 PROBLEM — C44.319 BASAL CELL CARCINOMA OF SKIN OF OTHER PARTS OF FACE: Status: ACTIVE | Noted: 2024-08-28

## 2024-08-28 PROCEDURE — 77280 THER RAD SIMULAJ FIELD SMPL: CPT

## 2024-08-28 PROCEDURE — OTHER IMAGE GUIDED - SUPERFICIAL RADIOTHERAPY: OTHER

## 2024-08-28 PROCEDURE — G6001 ECHO GUIDANCE RADIOTHERAPY: HCPCS | Mod: XU

## 2024-08-28 PROCEDURE — OTHER IMAGE GUIDED - SUPERFICIAL RADIOTHERAPY: TREATMENT VISIT: OTHER

## 2024-08-28 PROCEDURE — 77300 RADIATION THERAPY DOSE PLAN: CPT

## 2024-08-28 PROCEDURE — 77401 RADIATION TX DELIVERY SUPFC: CPT

## 2024-08-28 NOTE — PROCEDURE: IMAGE GUIDED - SUPERFICIAL RADIOTHERAPY
Body Location Override (Optional): Right Cheek
Detail Level: Detailed
Field Number: 1
Lesion Dimensions-X Axis In Cm: 0.6
Lesion Dimensions-Y Axis In Cm: 0.7
Lesion Margin Size In Cm: 0.5
Shield Size In Cm: 1.7 x 1.7
Applicator Size In Cm: 2.0 cm
Energy (Kv): 70
Treatment Time (Min): 0.45
Time, Dose, Fractionation Factor (Tdf) For Prescription 1: 97
Daily Dose (Cgy): 274.05
Number Of Fractions For Prescription 1: 20
Treatments Per Week: 3
Total Dose For Prescription 1 (Cgy): 5445
Add A Second Prescription?: No
Add Decay And Dose Adjustment Calculation?: Yes
Treatment Date (Start): 07/09/2024
Treatment Date (Stop): 08/16/2024
Treatment Date (Resume): 08/27/2024
Tdf Of Fractions Delivered: 69.5
Decay Factor: 0.98
Adjusted Tdf: 68.11
Remaining Tdf: 30
New Total Tdf (Remaining Tdf + Adjusted Tdf): 98.11
Additional Fraction(S) Needed:: 0
Dose Decay Verbiage For Visit Day Calculation: Per the request of Dr. Huynh dose decay calculation for this patient was performed due to break in treatment. Dose decay calculation is clinically necessary to determine appropriate changes to prescription, if necessary, for efficacy of treatment.\\n\\nPer Dr. Huynh, no prescription change was made today.
Bill For Physics Consultation: No - Render Text Only
Physics Documentation: (Physics Check Verbiage)

## 2024-08-28 NOTE — PROCEDURE: IMAGE GUIDED - SUPERFICIAL RADIOTHERAPY: TREATMENT VISIT
Ultrasound Used Text: High frequency ultrasound depth is 0.82 mm, which is 0.04 mm in difference from previous imaging.
Fraction Number: 16
Energy (Kv): 70
Bill For Simulation (Per Medicare, Typical Course Of Radiation Therapy Will Require Between One To Three Simulations): Yes- (Simple- 1 Site: 17428)
Ultrasound Not Used Text: Ultrasound was not performed today due to
Additional Change To Daily Dosage Administered Mid Treatment?: No
Daily Dosage (Cgy): 274.05
Bill For Treatment (73250)?: Yes
Calculate Total Cumulative Dose Automatically Or Manually: Manually
Total Cumulative Dose (Cgy): 4384.8
Treatment Documentation: This patient has been treated today with image-guided superficial radiation therapy for non-melanoma skin cancer. Written informed consent has been previously obtained from this patient for this treatment. This consent is documented in the patient's chart. The patient gave verbal consent to continue treatment today. The patient was treated with a specific radiation dose and setup as prescribed by the provider listed on this visit note. A Radiation Therapist performed administration of radiation under the supervision of a provider. The treatment parameters and cumulative dose are indicated above. Prior to administering the radiation, the patient underwent a verification therapeutic radiology simulation-aided field setting defining relevant normal and abnormal target anatomy and acquiring images with separate and distinct diagnostic high-frequency ultrasound to delineate tissues and determine whether to proceed with delivery of therapeutic, in addition to retrieve data necessary to develop an optimal radiation treatment process for the patient. The field placement simulation documents any change seen in overall tumor volume documented in the patient’s record, allows the clinician to indicate any needed changes in the treatment plan and/or prescription, provides diagnostic evaluation as the basis for performing the therapeutic procedure, and clearly identifies the information needed to decide to proceed with the therapeutic procedure. This process includes verification of the treatment port(s) and proper treatment positioning. All treatment ports were photographed within electronic medical records. The patient's lead blocking along with gross tumor volume and margin was confirmed. Considering superficial radiotherapy is clinical in setup, this requires the physician and radiation therapist to clarify the location interest being treated against initial images, ultrasound, pathology, and patient anatomy. Care was taken to ensure de la cruz treated were geometrically accurate and properly positioned using therapeutic radiology simulation-aided field setting verification per fraction. This process is also utilized to determine if any prescription or setup changes are necessary. These steps are therefore medically necessary to ensure safe and effective administration of radiation. Ongoing therapeutic radiology simulation-aided field setting verification is ordered throughout the course of therapy.\\n\\nA high-frequency ultrasound image was acquired today for a two-dimensional evaluation of the tumor volume, depth, width, breadth, review of prior response to treatment, provide geometric accuracy of field placement, and determine whether to proceed with therapeutic delivery. \\n\\nThe field placement and ultrasound imaging, per fraction, is separate and distinct from the initial simulation and is an important task in providing safe administration of superficial radiation therapy. Physician evaluation of the ultrasound information will be ongoing throughout the course of treatment and is deemed medically necessary to ensure the efficacy of treatment, whether to proceed with therapeutic delivery, and determine any necessary changes. Today's images were evaluated for determination of continuation of treatment with the current plan or with necessary changes as appropriate. Additionally, the use of ultrasound visualization and targeted assessment allows the patient to be able to see their cancer(s) progress, encouraging the patient to complete and maintain compliance through the full course of prescribed radiotherapy. Per Dr. Huynh, continued ultrasound guidance and therapeutic radiology simulation-aided field setting verification per fraction is required for field placement, measurement of tumor depth, tissue evaluation, progress, acute effect monitoring, and determination for therapeutic treatment delivery is appropriate.
Add X Modifier?: CHEN - Unusual Non-Overlapping Service
Additional Comments (Add Customization Of Note Here): Treatment area is showing  erythema.   She will continue to use ointment daily. She has 4 fractions remaining.
Prescription Used: 1

## 2024-09-03 ENCOUNTER — APPOINTMENT (OUTPATIENT)
Age: 59
Setting detail: DERMATOLOGY
End: 2024-09-04

## 2024-09-03 PROBLEM — C44.319 BASAL CELL CARCINOMA OF SKIN OF OTHER PARTS OF FACE: Status: ACTIVE | Noted: 2024-09-03

## 2024-09-03 PROCEDURE — 77401 RADIATION TX DELIVERY SUPFC: CPT

## 2024-09-03 PROCEDURE — OTHER IMAGE GUIDED - SUPERFICIAL RADIOTHERAPY: OTHER

## 2024-09-03 PROCEDURE — 77280 THER RAD SIMULAJ FIELD SMPL: CPT

## 2024-09-03 PROCEDURE — OTHER IMAGE GUIDED - SUPERFICIAL RADIOTHERAPY: TREATMENT VISIT: OTHER

## 2024-09-03 PROCEDURE — G6001 ECHO GUIDANCE RADIOTHERAPY: HCPCS | Mod: XU

## 2024-09-03 NOTE — PROCEDURE: IMAGE GUIDED - SUPERFICIAL RADIOTHERAPY
Body Location Override (Optional): Right Cheek
Detail Level: Detailed
Field Number: 1
Lesion Dimensions-X Axis In Cm: 0.6
Lesion Dimensions-Y Axis In Cm: 0.7
Lesion Margin Size In Cm: 0.5
Shield Size In Cm: 1.7 x 1.7
Applicator Size In Cm: 2.0 cm
Energy (Kv): 70
Treatment Time (Min): 0.45
Time, Dose, Fractionation Factor (Tdf) For Prescription 1: 97
Daily Dose (Cgy): 274.05
Number Of Fractions For Prescription 1: 20
Treatments Per Week: 3
Total Dose For Prescription 1 (Cgy): 5485
Add A Second Prescription?: No
Add Decay And Dose Adjustment Calculation?: Yes
Treatment Date (Start): 07/09/2024
Treatment Date (Stop): 08/16/2024
Treatment Date (Resume): 08/27/2024
Tdf Of Fractions Delivered: 69.5
Decay Factor: 0.98
Adjusted Tdf: 68.11
Remaining Tdf: 30
New Total Tdf (Remaining Tdf + Adjusted Tdf): 98.11
Additional Fraction(S) Needed:: 0
Dose Decay Verbiage For Visit Day Calculation: Per the request of Dr. Huynh dose decay calculation for this patient was performed due to break in treatment. Dose decay calculation is clinically necessary to determine appropriate changes to prescription, if necessary, for efficacy of treatment.\\n\\nPer Dr. Huynh, no prescription change was made today.
Bill For Physics Consultation: No - Render Text Only
Physics Documentation: (Physics Check Verbiage)

## 2024-09-03 NOTE — PROCEDURE: IMAGE GUIDED - SUPERFICIAL RADIOTHERAPY: TREATMENT VISIT
Ultrasound Used Text: High frequency ultrasound depth is 0.70 mm, which is 0.12 mm in difference from previous imaging.
Fraction Number: 17
Energy (Kv): 70
Bill For Simulation (Per Medicare, Typical Course Of Radiation Therapy Will Require Between One To Three Simulations): Yes- (Simple- 1 Site: 99155)
Ultrasound Not Used Text: Ultrasound was not performed today due to
Additional Change To Daily Dosage Administered Mid Treatment?: No
Daily Dosage (Cgy): 274.05
Bill For Treatment (79772)?: Yes
Calculate Total Cumulative Dose Automatically Or Manually: Manually
Total Cumulative Dose (Cgy): 4658.85
Treatment Documentation: This patient has been treated today with image-guided superficial radiation therapy for non-melanoma skin cancer. Written informed consent has been previously obtained from this patient for this treatment. This consent is documented in the patient's chart. The patient gave verbal consent to continue treatment today. The patient was treated with a specific radiation dose and setup as prescribed by the provider listed on this visit note. A Radiation Therapist performed administration of radiation under the supervision of a provider. The treatment parameters and cumulative dose are indicated above. Prior to administering the radiation, the patient underwent a verification therapeutic radiology simulation-aided field setting defining relevant normal and abnormal target anatomy and acquiring images with separate and distinct diagnostic high-frequency ultrasound to delineate tissues and determine whether to proceed with delivery of therapeutic, in addition to retrieve data necessary to develop an optimal radiation treatment process for the patient. The field placement simulation documents any change seen in overall tumor volume documented in the patient’s record, allows the clinician to indicate any needed changes in the treatment plan and/or prescription, provides diagnostic evaluation as the basis for performing the therapeutic procedure, and clearly identifies the information needed to decide to proceed with the therapeutic procedure. This process includes verification of the treatment port(s) and proper treatment positioning. All treatment ports were photographed within electronic medical records. The patient's lead blocking along with gross tumor volume and margin was confirmed. Considering superficial radiotherapy is clinical in setup, this requires the physician and radiation therapist to clarify the location interest being treated against initial images, ultrasound, pathology, and patient anatomy. Care was taken to ensure de [place] cruz treated were geometrically accurate and properly positioned using therapeutic radiology simulation-aided field setting verification per fraction. This process is also utilized to determine if any prescription or setup changes are necessary. These steps are therefore medically necessary to ensure safe and effective administration of radiation. Ongoing therapeutic radiology simulation-aided field setting verification is ordered throughout the course of therapy.\\n\\nA high-frequency ultrasound image was acquired today for a two-dimensional evaluation of the tumor volume, depth, width, breadth, review of prior response to treatment, provide geometric accuracy of field placement, and determine whether to proceed with therapeutic delivery. \\n\\nThe field placement and ultrasound imaging, per fraction, is separate and distinct from the initial simulation and is an important task in providing safe administration of superficial radiation therapy. Physician evaluation of the ultrasound information will be ongoing throughout the course of treatment and is deemed medically necessary to ensure the efficacy of treatment, whether to proceed with therapeutic delivery, and determine any necessary changes. Today's images were evaluated for determination of continuation of treatment with the current plan or with necessary changes as appropriate. Additionally, the use of ultrasound visualization and targeted assessment allows the patient to be able to see their cancer(s) progress, encouraging the patient to complete and maintain compliance through the full course of prescribed radiotherapy. Per Dr. Huynh, continued ultrasound guidance and therapeutic radiology simulation-aided field setting verification per fraction is required for field placement, measurement of tumor depth, tissue evaluation, progress, acute effect monitoring, and determination for therapeutic treatment delivery is appropriate.
Add X Modifier?: CHEN - Unusual Non-Overlapping Service
Additional Comments (Add Customization Of Note Here): Treatment area is showing  erythema.   She will continue to use ointment daily. She has 3 fractions remaining.
Prescription Used: 1

## 2024-09-05 ENCOUNTER — APPOINTMENT (OUTPATIENT)
Age: 59
Setting detail: DERMATOLOGY
End: 2024-09-05

## 2024-09-05 PROBLEM — C44.319 BASAL CELL CARCINOMA OF SKIN OF OTHER PARTS OF FACE: Status: ACTIVE | Noted: 2024-09-05

## 2024-09-05 PROCEDURE — 77280 THER RAD SIMULAJ FIELD SMPL: CPT

## 2024-09-05 PROCEDURE — 77401 RADIATION TX DELIVERY SUPFC: CPT

## 2024-09-05 PROCEDURE — G6001 ECHO GUIDANCE RADIOTHERAPY: HCPCS | Mod: XU

## 2024-09-05 PROCEDURE — OTHER IMAGE GUIDED - SUPERFICIAL RADIOTHERAPY: TREATMENT VISIT: OTHER

## 2024-09-05 PROCEDURE — OTHER IMAGE GUIDED - SUPERFICIAL RADIOTHERAPY: OTHER

## 2024-09-05 NOTE — PROCEDURE: IMAGE GUIDED - SUPERFICIAL RADIOTHERAPY
Body Location Override (Optional): Right Cheek
Detail Level: Detailed
Field Number: 1
Lesion Dimensions-X Axis In Cm: 0.6
Lesion Dimensions-Y Axis In Cm: 0.7
Lesion Margin Size In Cm: 0.5
Shield Size In Cm: 1.7 x 1.7
Applicator Size In Cm: 2.0 cm
Energy (Kv): 70
Treatment Time (Min): 0.45
Time, Dose, Fractionation Factor (Tdf) For Prescription 1: 97
Daily Dose (Cgy): 274.05
Number Of Fractions For Prescription 1: 20
Treatments Per Week: 3
Total Dose For Prescription 1 (Cgy): 5437
Add A Second Prescription?: No
Add Decay And Dose Adjustment Calculation?: Yes
Treatment Date (Start): 07/09/2024
Treatment Date (Stop): 08/16/2024
Treatment Date (Resume): 08/27/2024
Tdf Of Fractions Delivered: 69.5
Decay Factor: 0.98
Adjusted Tdf: 68.11
Remaining Tdf: 30
New Total Tdf (Remaining Tdf + Adjusted Tdf): 98.11
Additional Fraction(S) Needed:: 0
Dose Decay Verbiage For Visit Day Calculation: Per the request of Dr. Huynh dose decay calculation for this patient was performed due to break in treatment. Dose decay calculation is clinically necessary to determine appropriate changes to prescription, if necessary, for efficacy of treatment.\\n\\nPer Dr. Huynh, no prescription change was made today.
Bill For Physics Consultation: No - Render Text Only
Physics Documentation: (Physics Check Verbiage)

## 2024-09-05 NOTE — PROCEDURE: IMAGE GUIDED - SUPERFICIAL RADIOTHERAPY: TREATMENT VISIT
Ultrasound Used Text: High frequency ultrasound depth is 0.70 mm, which is 0 mm in difference from previous imaging.
Fraction Number: 17
Energy (Kv): 70
Bill For Simulation (Per Medicare, Typical Course Of Radiation Therapy Will Require Between One To Three Simulations): Yes- (Simple- 1 Site: 58827)
Ultrasound Not Used Text: Ultrasound was not performed today due to
Additional Change To Daily Dosage Administered Mid Treatment?: No
Daily Dosage (Cgy): 274.05
Bill For Treatment ([id number])?: Yes
Calculate Total Cumulative Dose Automatically Or Manually: Manually
Total Cumulative Dose (Cgy): 4932.9
Treatment Documentation: This patient has been treated today with image-guided superficial radiation therapy for non-melanoma skin cancer. Written informed consent has been previously obtained from this patient for this treatment. This consent is documented in the patient's chart. The patient gave verbal consent to continue treatment today. The patient was treated with a specific radiation dose and setup as prescribed by the provider listed on this visit note. A Radiation Therapist performed administration of radiation under the supervision of a provider. The treatment parameters and cumulative dose are indicated above. Prior to administering the radiation, the patient underwent a verification therapeutic radiology simulation-aided field setting defining relevant normal and abnormal target anatomy and acquiring images with separate and distinct diagnostic high-frequency ultrasound to delineate tissues and determine whether to proceed with delivery of therapeutic, in addition to retrieve data necessary to develop an optimal radiation treatment process for the patient. The field placement simulation documents any change seen in overall tumor volume documented in the patient’s record, allows the clinician to indicate any needed changes in the treatment plan and/or prescription, provides diagnostic evaluation as the basis for performing the therapeutic procedure, and clearly identifies the information needed to decide to proceed with the therapeutic procedure. This process includes verification of the treatment port(s) and proper treatment positioning. All treatment ports were photographed within electronic medical records. The patient's lead blocking along with gross tumor volume and margin was confirmed. Considering superficial radiotherapy is clinical in setup, this requires the physician and radiation therapist to clarify the location interest being treated against initial images, ultrasound, pathology, and patient anatomy. Care was taken to ensure de [place] cruz treated were geometrically accurate and properly positioned using therapeutic radiology simulation-aided field setting verification per fraction. This process is also utilized to determine if any prescription or setup changes are necessary. These steps are therefore medically necessary to ensure safe and effective administration of radiation. Ongoing therapeutic radiology simulation-aided field setting verification is ordered throughout the course of therapy.\\n\\nA high-frequency ultrasound image was acquired today for a two-dimensional evaluation of the tumor volume, depth, width, breadth, review of prior response to treatment, provide geometric accuracy of field placement, and determine whether to proceed with therapeutic delivery. \\n\\nThe field placement and ultrasound imaging, per fraction, is separate and distinct from the initial simulation and is an important task in providing safe administration of superficial radiation therapy. Physician evaluation of the ultrasound information will be ongoing throughout the course of treatment and is deemed medically necessary to ensure the efficacy of treatment, whether to proceed with therapeutic delivery, and determine any necessary changes. Today's images were evaluated for determination of continuation of treatment with the current plan or with necessary changes as appropriate. Additionally, the use of ultrasound visualization and targeted assessment allows the patient to be able to see their cancer(s) progress, encouraging the patient to complete and maintain compliance through the full course of prescribed radiotherapy. Per Dr. Huynh, continued ultrasound guidance and therapeutic radiology simulation-aided field setting verification per fraction is required for field placement, measurement of tumor depth, tissue evaluation, progress, acute effect monitoring, and determination for therapeutic treatment delivery is appropriate.
Add X Modifier?: CHEN - Unusual Non-Overlapping Service
Additional Comments (Add Customization Of Note Here): Treatment area is showing  erythema.   She will continue to use ointment daily. She has 2 fractions remaining.
Prescription Used: 1

## 2024-09-11 ENCOUNTER — APPOINTMENT (OUTPATIENT)
Age: 59
Setting detail: DERMATOLOGY
End: 2024-09-12

## 2024-09-11 PROBLEM — C44.319 BASAL CELL CARCINOMA OF SKIN OF OTHER PARTS OF FACE: Status: ACTIVE | Noted: 2024-09-11

## 2024-09-11 PROCEDURE — 77401 RADIATION TX DELIVERY SUPFC: CPT

## 2024-09-11 PROCEDURE — G6001 ECHO GUIDANCE RADIOTHERAPY: HCPCS | Mod: XU

## 2024-09-11 PROCEDURE — 77280 THER RAD SIMULAJ FIELD SMPL: CPT

## 2024-09-11 PROCEDURE — OTHER IMAGE GUIDED - SUPERFICIAL RADIOTHERAPY: TREATMENT VISIT: OTHER

## 2024-09-11 PROCEDURE — OTHER IMAGE GUIDED - SUPERFICIAL RADIOTHERAPY: OTHER

## 2024-09-11 NOTE — PROCEDURE: IMAGE GUIDED - SUPERFICIAL RADIOTHERAPY
Body Location Override (Optional): Right Cheek
Detail Level: Detailed
Field Number: 1
Lesion Dimensions-X Axis In Cm: 0.6
Lesion Dimensions-Y Axis In Cm: 0.7
Lesion Margin Size In Cm: 0.5
Shield Size In Cm: 1.7 x 1.7
Applicator Size In Cm: 2.0 cm
Energy (Kv): 70
Treatment Time (Min): 0.45
Time, Dose, Fractionation Factor (Tdf) For Prescription 1: 97
Daily Dose (Cgy): 274.05
Number Of Fractions For Prescription 1: 20
Treatments Per Week: 3
Total Dose For Prescription 1 (Cgy): 5409
Add A Second Prescription?: No
Add Decay And Dose Adjustment Calculation?: Yes
Treatment Date (Start): 07/09/2024
Treatment Date (Stop): 08/16/2024
Treatment Date (Resume): 08/27/2024
Tdf Of Fractions Delivered: 69.5
Decay Factor: 0.98
Adjusted Tdf: 68.11
Remaining Tdf: 30
New Total Tdf (Remaining Tdf + Adjusted Tdf): 98.11
Additional Fraction(S) Needed:: 0
Dose Decay Verbiage For Visit Day Calculation: Per the request of Dr. Huynh dose decay calculation for this patient was performed due to break in treatment. Dose decay calculation is clinically necessary to determine appropriate changes to prescription, if necessary, for efficacy of treatment.\\n\\nPer Dr. Huynh, no prescription change was made today.
Bill For Physics Consultation: No - Render Text Only
Physics Documentation: (Physics Check Verbiage)

## 2024-09-11 NOTE — PROCEDURE: IMAGE GUIDED - SUPERFICIAL RADIOTHERAPY: TREATMENT VISIT
Ultrasound Used Text: High frequency ultrasound depth is 0.54 mm, which is 0.16mm in difference from previous imaging.
Fraction Number: 19
Energy (Kv): 70
Bill For Simulation (Per Medicare, Typical Course Of Radiation Therapy Will Require Between One To Three Simulations): Yes- (Simple- 1 Site: 05729)
Ultrasound Not Used Text: Ultrasound was not performed today due to
Additional Change To Daily Dosage Administered Mid Treatment?: No
Daily Dosage (Cgy): 274.05
Bill For Treatment (42922)?: Yes
Calculate Total Cumulative Dose Automatically Or Manually: Manually
Total Cumulative Dose (Cgy): 5206.95
Treatment Documentation: This patient has been treated today with image-guided superficial radiation therapy for non-melanoma skin cancer. Written informed consent has been previously obtained from this patient for this treatment. This consent is documented in the patient's chart. The patient gave verbal consent to continue treatment today. The patient was treated with a specific radiation dose and setup as prescribed by the provider listed on this visit note. A Radiation Therapist performed administration of radiation under the supervision of a provider. The treatment parameters and cumulative dose are indicated above. Prior to administering the radiation, the patient underwent a verification therapeutic radiology simulation-aided field setting defining relevant normal and abnormal target anatomy and acquiring images with separate and distinct diagnostic high-frequency ultrasound to delineate tissues and determine whether to proceed with delivery of therapeutic, in addition to retrieve data necessary to develop an optimal radiation treatment process for the patient. The field placement simulation documents any change seen in overall tumor volume documented in the patient’s record, allows the clinician to indicate any needed changes in the treatment plan and/or prescription, provides diagnostic evaluation as the basis for performing the therapeutic procedure, and clearly identifies the information needed to decide to proceed with the therapeutic procedure. This process includes verification of the treatment port(s) and proper treatment positioning. All treatment ports were photographed within electronic medical records. The patient's lead blocking along with gross tumor volume and margin was confirmed. Considering superficial radiotherapy is clinical in setup, this requires the physician and radiation therapist to clarify the location interest being treated against initial images, ultrasound, pathology, and patient anatomy. Care was taken to ensure de la cruz treated were geometrically accurate and properly positioned using therapeutic radiology simulation-aided field setting verification per fraction. This process is also utilized to determine if any prescription or setup changes are necessary. These steps are therefore medically necessary to ensure safe and effective administration of radiation. Ongoing therapeutic radiology simulation-aided field setting verification is ordered throughout the course of therapy.\\n\\nA high-frequency ultrasound image was acquired today for a two-dimensional evaluation of the tumor volume, depth, width, breadth, review of prior response to treatment, provide geometric accuracy of field placement, and determine whether to proceed with therapeutic delivery. \\n\\nThe field placement and ultrasound imaging, per fraction, is separate and distinct from the initial simulation and is an important task in providing safe administration of superficial radiation therapy. Physician evaluation of the ultrasound information will be ongoing throughout the course of treatment and is deemed medically necessary to ensure the efficacy of treatment, whether to proceed with therapeutic delivery, and determine any necessary changes. Today's images were evaluated for determination of continuation of treatment with the current plan or with necessary changes as appropriate. Additionally, the use of ultrasound visualization and targeted assessment allows the patient to be able to see their cancer(s) progress, encouraging the patient to complete and maintain compliance through the full course of prescribed radiotherapy. Per Dr. Huynh, continued ultrasound guidance and therapeutic radiology simulation-aided field setting verification per fraction is required for field placement, measurement of tumor depth, tissue evaluation, progress, acute effect monitoring, and determination for therapeutic treatment delivery is appropriate.
Add X Modifier?: CHEN - Unusual Non-Overlapping Service
Additional Comments (Add Customization Of Note Here): Treatment area is showing  erythema.   She will continue to use ointment daily. She has 1 fraction remaining.
Prescription Used: 1

## 2024-09-12 ENCOUNTER — APPOINTMENT (OUTPATIENT)
Age: 59
Setting detail: DERMATOLOGY
End: 2024-09-16

## 2024-09-12 PROBLEM — C44.319 BASAL CELL CARCINOMA OF SKIN OF OTHER PARTS OF FACE: Status: ACTIVE | Noted: 2024-09-12

## 2024-09-12 PROCEDURE — 77401 RADIATION TX DELIVERY SUPFC: CPT

## 2024-09-12 PROCEDURE — OTHER IMAGE GUIDED - SUPERFICIAL RADIOTHERAPY: OTHER

## 2024-09-12 PROCEDURE — 77280 THER RAD SIMULAJ FIELD SMPL: CPT

## 2024-09-12 PROCEDURE — G6001 ECHO GUIDANCE RADIOTHERAPY: HCPCS | Mod: XU

## 2024-09-12 PROCEDURE — OTHER IMAGE GUIDED - SUPERFICIAL RADIOTHERAPY: TREATMENT VISIT: OTHER

## 2024-09-12 NOTE — PROCEDURE: IMAGE GUIDED - SUPERFICIAL RADIOTHERAPY
Body Location Override (Optional): Right Cheek
Detail Level: Detailed
Field Number: 1
Lesion Dimensions-X Axis In Cm: 0.6
Lesion Dimensions-Y Axis In Cm: 0.7
Lesion Margin Size In Cm: 0.5
Shield Size In Cm: 1.7 x 1.7
Applicator Size In Cm: 2.0 cm
Energy (Kv): 70
Treatment Time (Min): 0.45
Time, Dose, Fractionation Factor (Tdf) For Prescription 1: 97
Daily Dose (Cgy): 274.05
Number Of Fractions For Prescription 1: 20
Treatments Per Week: 3
Total Dose For Prescription 1 (Cgy): 5443
Add A Second Prescription?: No
Add Decay And Dose Adjustment Calculation?: Yes
Treatment Date (Start): 07/09/2024
Treatment Date (Stop): 08/16/2024
Treatment Date (Resume): 08/27/2024
Tdf Of Fractions Delivered: 69.5
Decay Factor: 0.98
Adjusted Tdf: 68.11
Remaining Tdf: 30
New Total Tdf (Remaining Tdf + Adjusted Tdf): 98.11
Additional Fraction(S) Needed:: 0
Dose Decay Verbiage For Visit Day Calculation: Per the request of Dr. Huynh dose decay calculation for this patient was performed due to break in treatment. Dose decay calculation is clinically necessary to determine appropriate changes to prescription, if necessary, for efficacy of treatment.\\n\\nPer Dr. Huynh, no prescription change was made today.
Bill For Physics Consultation: No - Render Text Only
Physics Documentation: (Physics Check Verbiage)